# Patient Record
Sex: MALE | Race: WHITE | NOT HISPANIC OR LATINO | URBAN - METROPOLITAN AREA
[De-identification: names, ages, dates, MRNs, and addresses within clinical notes are randomized per-mention and may not be internally consistent; named-entity substitution may affect disease eponyms.]

---

## 2023-08-22 LAB
ALANINE AMINOTRANSFERASE (SGPT) (U/L) IN SER/PLAS: 19 U/L (ref 10–52)
ALBUMIN (G/DL) IN SER/PLAS: 4.6 G/DL (ref 3.4–5)
ALKALINE PHOSPHATASE (U/L) IN SER/PLAS: 55 U/L (ref 33–120)
ANION GAP IN SER/PLAS: 11 MMOL/L (ref 10–20)
ASPARTATE AMINOTRANSFERASE (SGOT) (U/L) IN SER/PLAS: 17 U/L (ref 9–39)
BILIRUBIN TOTAL (MG/DL) IN SER/PLAS: 0.6 MG/DL (ref 0–1.2)
CALCIUM (MG/DL) IN SER/PLAS: 9.5 MG/DL (ref 8.6–10.3)
CARBON DIOXIDE, TOTAL (MMOL/L) IN SER/PLAS: 28 MMOL/L (ref 21–32)
CHLORIDE (MMOL/L) IN SER/PLAS: 102 MMOL/L (ref 98–107)
CHOLESTEROL (MG/DL) IN SER/PLAS: 184 MG/DL (ref 0–199)
CHOLESTEROL IN HDL (MG/DL) IN SER/PLAS: 46.7 MG/DL
CHOLESTEROL/HDL RATIO: 3.9
CREATININE (MG/DL) IN SER/PLAS: 1.48 MG/DL (ref 0.5–1.3)
GFR MALE: 56 ML/MIN/1.73M2
GLUCOSE (MG/DL) IN SER/PLAS: 93 MG/DL (ref 74–99)
LDL: 116 MG/DL (ref 0–99)
POTASSIUM (MMOL/L) IN SER/PLAS: 4.4 MMOL/L (ref 3.5–5.3)
PROTEIN TOTAL: 7.5 G/DL (ref 6.4–8.2)
SODIUM (MMOL/L) IN SER/PLAS: 137 MMOL/L (ref 136–145)
THYROTROPIN (MIU/L) IN SER/PLAS BY DETECTION LIMIT <= 0.05 MIU/L: 2.14 MIU/L (ref 0.44–3.98)
TRIGLYCERIDE (MG/DL) IN SER/PLAS: 108 MG/DL (ref 0–149)
UREA NITROGEN (MG/DL) IN SER/PLAS: 17 MG/DL (ref 6–23)
VLDL: 22 MG/DL (ref 0–40)

## 2023-08-23 LAB — CALCIDIOL (25 OH VITAMIN D3) (NG/ML) IN SER/PLAS: 73 NG/ML

## 2023-09-19 ENCOUNTER — HOSPITAL ENCOUNTER (OUTPATIENT)
Dept: DATA CONVERSION | Facility: HOSPITAL | Age: 53
Discharge: HOME | End: 2023-09-19

## 2023-09-19 DIAGNOSIS — N28.9 DISORDER OF KIDNEY AND URETER, UNSPECIFIED: ICD-10-CM

## 2023-09-19 LAB
ANION GAP SERPL CALCULATED.3IONS-SCNC: 11 MMOL/L (ref 0–19)
BUN SERPL-MCNC: 20 MG/DL (ref 8–25)
BUN/CREAT SERPL: 13.3 RATIO (ref 8–21)
CALCIUM SERPL-MCNC: 9.4 MG/DL (ref 8.5–10.4)
CHLORIDE SERPL-SCNC: 103 MMOL/L (ref 97–107)
CO2 SERPL-SCNC: 24 MMOL/L (ref 24–31)
CREAT SERPL-MCNC: 1.5 MG/DL (ref 0.4–1.6)
GFR SERPL CREATININE-BSD FRML MDRD: 56 ML/MIN/1.73 M2
GLUCOSE SERPL-MCNC: 98 MG/DL (ref 65–99)
POTASSIUM SERPL-SCNC: 4.9 MMOL/L (ref 3.4–5.1)
SODIUM SERPL-SCNC: 138 MMOL/L (ref 133–145)

## 2023-09-20 PROBLEM — R20.0 NUMBNESS: Status: ACTIVE | Noted: 2023-09-20

## 2023-09-20 PROBLEM — J45.40 MODERATE PERSISTENT ASTHMA WITHOUT COMPLICATION (HHS-HCC): Status: ACTIVE | Noted: 2023-09-20

## 2023-09-20 PROBLEM — E55.9 VITAMIN D DEFICIENCY: Status: ACTIVE | Noted: 2023-09-20

## 2023-09-20 PROBLEM — M16.11 OSTEOARTHRITIS OF RIGHT HIP: Status: ACTIVE | Noted: 2023-09-20

## 2023-09-20 PROBLEM — M15.9 PRIMARY OSTEOARTHRITIS INVOLVING MULTIPLE JOINTS: Status: ACTIVE | Noted: 2023-09-20

## 2023-09-20 PROBLEM — I10 HTN (HYPERTENSION): Status: ACTIVE | Noted: 2023-09-20

## 2023-09-20 PROBLEM — R53.82 CHRONIC FATIGUE: Status: ACTIVE | Noted: 2023-09-20

## 2023-09-20 PROBLEM — T78.01XA ANAPHYLAXIS DUE TO PEANUTS: Status: ACTIVE | Noted: 2023-09-20

## 2023-09-20 PROBLEM — M17.11 PATELLOFEMORAL ARTHRITIS OF RIGHT KNEE: Status: ACTIVE | Noted: 2023-09-20

## 2023-09-20 PROBLEM — S73.109A HIP SPRAIN: Status: ACTIVE | Noted: 2023-09-20

## 2023-09-20 PROBLEM — J45.909 ASTHMA (HHS-HCC): Status: ACTIVE | Noted: 2023-09-20

## 2023-09-20 PROBLEM — M15.0 PRIMARY OSTEOARTHRITIS INVOLVING MULTIPLE JOINTS: Status: ACTIVE | Noted: 2023-09-20

## 2023-09-20 PROBLEM — M25.361 PATELLAR INSTABILITY OF RIGHT KNEE: Status: ACTIVE | Noted: 2023-09-20

## 2023-09-20 RX ORDER — LISINOPRIL 20 MG/1
20 TABLET ORAL DAILY
COMMUNITY
Start: 2013-08-12 | End: 2023-10-30

## 2023-09-20 RX ORDER — ALBUTEROL SULFATE 90 UG/1
2 POWDER, METERED RESPIRATORY (INHALATION) EVERY 8 HOURS
COMMUNITY
Start: 2016-04-19 | End: 2024-02-21 | Stop reason: SDUPTHER

## 2023-09-20 RX ORDER — LEVOCETIRIZINE DIHYDROCHLORIDE 5 MG/1
5 TABLET, FILM COATED ORAL EVERY EVENING
COMMUNITY
End: 2024-02-21 | Stop reason: SDUPTHER

## 2023-09-20 RX ORDER — MELOXICAM 15 MG/1
1 TABLET ORAL DAILY PRN
COMMUNITY
Start: 2021-04-27

## 2023-09-20 RX ORDER — FLUTICASONE FUROATE AND VILANTEROL 200; 25 UG/1; UG/1
1 POWDER RESPIRATORY (INHALATION) DAILY
COMMUNITY
End: 2024-02-21 | Stop reason: SDUPTHER

## 2023-09-20 RX ORDER — FLUNISOLIDE 0.25 MG/ML
2 SOLUTION NASAL DAILY
COMMUNITY
End: 2024-02-21 | Stop reason: SDUPTHER

## 2023-09-20 RX ORDER — CHOLECALCIFEROL (VITAMIN D3) 25 MCG
25 TABLET ORAL DAILY
COMMUNITY

## 2023-09-20 RX ORDER — EPINEPHRINE 0.3 MG/.3ML
1 INJECTION INTRAMUSCULAR ONCE AS NEEDED
COMMUNITY
Start: 2016-03-23

## 2023-09-20 RX ORDER — MOMETASONE FUROATE 1 MG/G
CREAM TOPICAL
COMMUNITY
Start: 2016-07-21

## 2023-10-29 DIAGNOSIS — I10 ESSENTIAL (PRIMARY) HYPERTENSION: ICD-10-CM

## 2023-10-30 RX ORDER — LISINOPRIL 20 MG/1
20 TABLET ORAL DAILY
Qty: 90 TABLET | Refills: 1 | Status: SHIPPED | OUTPATIENT
Start: 2023-10-30 | End: 2024-02-21 | Stop reason: SDUPTHER

## 2024-01-18 ENCOUNTER — TELEMEDICINE (OUTPATIENT)
Dept: PRIMARY CARE | Facility: CLINIC | Age: 54
End: 2024-01-18
Payer: COMMERCIAL

## 2024-01-18 DIAGNOSIS — J01.90 ACUTE NON-RECURRENT SINUSITIS, UNSPECIFIED LOCATION: Primary | ICD-10-CM

## 2024-01-18 PROCEDURE — 99442 PR PHYS/QHP TELEPHONE EVALUATION 11-20 MIN: CPT | Performed by: STUDENT IN AN ORGANIZED HEALTH CARE EDUCATION/TRAINING PROGRAM

## 2024-01-18 RX ORDER — TAMSULOSIN HYDROCHLORIDE 0.4 MG/1
0.4 CAPSULE ORAL EVERY EVENING
COMMUNITY

## 2024-01-18 RX ORDER — GUAIFENESIN 1200 MG/1
1200 TABLET, EXTENDED RELEASE ORAL EVERY 12 HOURS PRN
Qty: 28 TABLET | Refills: 0 | Status: SHIPPED | OUTPATIENT
Start: 2024-01-18 | End: 2024-02-01

## 2024-01-18 RX ORDER — AMOXICILLIN AND CLAVULANATE POTASSIUM 875; 125 MG/1; MG/1
875 TABLET, FILM COATED ORAL 2 TIMES DAILY
Qty: 20 TABLET | Refills: 0 | Status: SHIPPED | OUTPATIENT
Start: 2024-01-18 | End: 2024-01-28

## 2024-01-18 ASSESSMENT — PATIENT HEALTH QUESTIONNAIRE - PHQ9
2. FEELING DOWN, DEPRESSED OR HOPELESS: NOT AT ALL
SUM OF ALL RESPONSES TO PHQ9 QUESTIONS 1 AND 2: 0
1. LITTLE INTEREST OR PLEASURE IN DOING THINGS: NOT AT ALL

## 2024-01-18 ASSESSMENT — PAIN SCALES - GENERAL: PAINLEVEL: 3

## 2024-01-18 NOTE — PROGRESS NOTES
Outpatient Visit Note    No chief complaint on file.      With patient's permission, this is a Telemedicine visit with AUDIO ONLY. The provider and patient participated in this telemedicine encounter.    HPI:  Ashley Velazquez is a 53 y.o. male presenting to discuss recent illness symptoms.    Started middle of last week.  Runny nose, sinus congestion, cough.  Doesn't otherwise feel sick.  No sick contacts.  No GI sx.    Mucinex and Day Quil without significant relief.    Used inhaler a few times, working well when used.  No other SOB or chest pain..         Current Medications  Current Outpatient Medications   Medication Instructions    albuterol (ProAir RespiClick) 90 mcg/actuation aerosol powdr breath activated inhaler 2 puffs, inhalation, Every 8 hours, As needed 30 minutes before exercise.    cholecalciferol (VITAMIN D3) 25 mcg, oral, Daily    EPINEPHrine (EpiPen 2-Hosea) 0.3 mg/0.3 mL injection syringe 1 Syringe, intramuscular, Once as needed    flunisolide (Nasalide) 25 mcg (0.025 %) spray,non-aerosol 2 sprays, Each Nostril, Daily    fluticasone furoate-vilanteroL (Breo Ellipta) 200-25 mcg/dose inhaler 1 puff, inhalation, Daily    levocetirizine (XYZAL) 5 mg, oral, Every evening    lisinopril 20 mg, oral, Daily    meloxicam (Mobic) 15 mg tablet 1 tablet, oral, Daily PRN    mometasone (Elocon) 0.1 % cream Topical, APPLY SPARINGLY TO AFFECTED AREAS TWICE DAILY.(AM AND PM).    MULTIVITAMIN ORAL 1 tablet, oral, Daily    tamsulosin (FLOMAX) 0.4 mg, oral, Every evening        Allergies  Allergies   Allergen Reactions    Peanut Swelling    Grass Pollen Itching    Other Itching    Shellfish Derived Unknown     Tested via allergy testing        Past Medical History:   Diagnosis Date    Personal history of diseases of the skin and subcutaneous tissue 04/19/2016    History of urticaria    Personal history of other diseases of the musculoskeletal system and connective tissue 04/19/2016    History of arthritis    Personal  history of other diseases of the respiratory system 04/19/2016    History of asthma      Past Surgical History:   Procedure Laterality Date    SINUS SURGERY  04/19/2016    Sinus Surgery     Family History   Problem Relation Name Age of Onset    Hypertension Mother      Hypertension Father      No Known Problems Sister      No Known Problems Sister          Tobacco Use: Not on file        ROS  All pertinent positive symptoms are included in the history of present illness.  All other systems have been reviewed and are negative and noncontributory to this patient's current ailments.    VITAL SIGNS  There were no vitals filed for this visit.  There were no vitals filed for this visit.   There is no height or weight on file to calculate BMI.   Patient is unable to proivide    PHYSICAL EXAM  GENERAL:  Alert and oriented x 3, Pleasant and cooperative, No acute distress.   LUNGS:  No conversational dyspnea or cough during encounter.   PSYCH:  appropriate mood and affect, no difficulty with speech.   Telemedicine visit, no other exam component done.      Assessment/Plan   Problem List Items Addressed This Visit    None  Visit Diagnoses         Codes    Acute non-recurrent sinusitis, unspecified location    -  Primary J01.90    Relevant Medications    amoxicillin-pot clavulanate (Augmentin) 875-125 mg tablet    guaiFENesin (Mucinex) 1,200 mg tablet extended release 12hr          Lisa Grider MD   01/18/24   9:27 AM

## 2024-01-18 NOTE — PROGRESS NOTES
Pt states has runny nose, cough, congestion, sinus pressure has tried dayqiul and mucinex, pt state shas not taken any med s today

## 2024-02-21 ENCOUNTER — OFFICE VISIT (OUTPATIENT)
Dept: PRIMARY CARE | Facility: CLINIC | Age: 54
End: 2024-02-21
Payer: COMMERCIAL

## 2024-02-21 VITALS
HEART RATE: 83 BPM | BODY MASS INDEX: 30.46 KG/M2 | DIASTOLIC BLOOD PRESSURE: 80 MMHG | OXYGEN SATURATION: 97 % | WEIGHT: 201 LBS | HEIGHT: 68 IN | TEMPERATURE: 96.9 F | SYSTOLIC BLOOD PRESSURE: 120 MMHG

## 2024-02-21 DIAGNOSIS — I10 ESSENTIAL (PRIMARY) HYPERTENSION: ICD-10-CM

## 2024-02-21 DIAGNOSIS — Z13.6 SCREENING FOR CARDIOVASCULAR CONDITION: ICD-10-CM

## 2024-02-21 DIAGNOSIS — J30.9 ALLERGIC RHINITIS, UNSPECIFIED SEASONALITY, UNSPECIFIED TRIGGER: ICD-10-CM

## 2024-02-21 DIAGNOSIS — R94.31 ABNORMAL EKG: ICD-10-CM

## 2024-02-21 DIAGNOSIS — J45.40 MODERATE PERSISTENT ASTHMA WITHOUT COMPLICATION (HHS-HCC): ICD-10-CM

## 2024-02-21 DIAGNOSIS — R07.9 INTERMITTENT CHEST PAIN: Primary | ICD-10-CM

## 2024-02-21 PROCEDURE — 3079F DIAST BP 80-89 MM HG: CPT | Performed by: STUDENT IN AN ORGANIZED HEALTH CARE EDUCATION/TRAINING PROGRAM

## 2024-02-21 PROCEDURE — 1036F TOBACCO NON-USER: CPT | Performed by: STUDENT IN AN ORGANIZED HEALTH CARE EDUCATION/TRAINING PROGRAM

## 2024-02-21 PROCEDURE — 93000 ELECTROCARDIOGRAM COMPLETE: CPT | Performed by: STUDENT IN AN ORGANIZED HEALTH CARE EDUCATION/TRAINING PROGRAM

## 2024-02-21 PROCEDURE — 93005 ELECTROCARDIOGRAM TRACING: CPT | Performed by: STUDENT IN AN ORGANIZED HEALTH CARE EDUCATION/TRAINING PROGRAM

## 2024-02-21 PROCEDURE — 99214 OFFICE O/P EST MOD 30 MIN: CPT | Performed by: STUDENT IN AN ORGANIZED HEALTH CARE EDUCATION/TRAINING PROGRAM

## 2024-02-21 PROCEDURE — 3074F SYST BP LT 130 MM HG: CPT | Performed by: STUDENT IN AN ORGANIZED HEALTH CARE EDUCATION/TRAINING PROGRAM

## 2024-02-21 RX ORDER — LISINOPRIL 20 MG/1
20 TABLET ORAL DAILY
Qty: 90 TABLET | Refills: 1 | Status: SHIPPED | OUTPATIENT
Start: 2024-02-21 | End: 2024-04-01 | Stop reason: SDUPTHER

## 2024-02-21 RX ORDER — FLUTICASONE FUROATE AND VILANTEROL 200; 25 UG/1; UG/1
1 POWDER RESPIRATORY (INHALATION) DAILY
Qty: 3 EACH | Refills: 1 | Status: SHIPPED | OUTPATIENT
Start: 2024-02-21 | End: 2024-04-11 | Stop reason: SDUPTHER

## 2024-02-21 RX ORDER — FLUNISOLIDE 0.25 MG/ML
2 SOLUTION NASAL DAILY
Qty: 25 ML | Refills: 5 | Status: SHIPPED | OUTPATIENT
Start: 2024-02-21 | End: 2024-05-21

## 2024-02-21 RX ORDER — ALBUTEROL SULFATE 90 UG/1
2 POWDER, METERED RESPIRATORY (INHALATION) EVERY 8 HOURS
Qty: 3 EACH | Refills: 1 | Status: SHIPPED | OUTPATIENT
Start: 2024-02-21 | End: 2024-08-19

## 2024-02-21 RX ORDER — LEVOCETIRIZINE DIHYDROCHLORIDE 5 MG/1
5 TABLET, FILM COATED ORAL EVERY EVENING
Qty: 90 TABLET | Refills: 1 | Status: SHIPPED | OUTPATIENT
Start: 2024-02-21 | End: 2024-04-01 | Stop reason: SDUPTHER

## 2024-02-21 ASSESSMENT — PATIENT HEALTH QUESTIONNAIRE - PHQ9
1. LITTLE INTEREST OR PLEASURE IN DOING THINGS: NOT AT ALL
SUM OF ALL RESPONSES TO PHQ9 QUESTIONS 1 AND 2: 0
2. FEELING DOWN, DEPRESSED OR HOPELESS: NOT AT ALL

## 2024-02-21 ASSESSMENT — PAIN SCALES - GENERAL: PAINLEVEL: 0-NO PAIN

## 2024-02-21 NOTE — PATIENT INSTRUCTIONS
Thank you for coming to see me today.    Medication refill sent to pharmacy, we will fax Breo prescription as usual.    Go to the lab for fasting blood work, we will call you with all results.    CT cardiac scoring ordered today, call to schedule.    Cardiology referral also entered today, call to schedule.    Follow-up with me in 6 months for physical exam and labs, sooner if needed (pending lab and imaging results)

## 2024-02-21 NOTE — PROGRESS NOTES
"Subjective   Ashley Velazquez is a 53 y.o. male who presents for follow up.    HPI:    Presenting for medication refills.    Also concern for intermittent left-sided sharp chest pain.  No current symptoms today.  Seems to happen randomly, often at rest.  No apparent exacerbation with exertion or physical activity.  He is concerned due to family history of heart disease.    Denies any shortness of breath.  Using inhalers as prescribed.    Ongoing allergic sinusitis issues, using Xyzal, nasal spray.    Compliant with blood pressure medications as prescribed.  No recent missed dosages.  Intermittent chest pain as noted above.  No pressure/palpitations, no headache or vision changes.  No noted leg swelling or orthopnea.    ROS:   Review of systems is essentially negative for all systems except for any identified issues in HPI above.    Objective     /80   Pulse 83   Temp 36.1 °C (96.9 °F)   Ht 1.727 m (5' 8\")   Wt 91.2 kg (201 lb)   SpO2 97%   BMI 30.56 kg/m²      PHYSICAL EXAM    GENERAL  Well-appearing, pleasant and cooperative.  No acute distress.    HEENT  HEAD:   Normocephalic.  Atraumatic.  EYES:  PERRLA.  No scleral icterus or conjunctival injection.  NECK:  No adenopathy.  No palpable thyroid enlargement or nodules.    THROAT:  Moist oropharynx without tonsillar enlargement or exudates.    LUNGS:    Clear to auscultation bilaterally.  No wheezes, rales, rhonchi.    CARDIAC:  Regular rate and rhythm.  Normal S1S2.  No murmurs/rubs/gallops.    ABDOMEN:  Soft, non-tender, non-distended.  No hepatosplenomegaly.  Normoactive bowel sounds.    MUSCULOSKELETAL:  No gross abnormalities.   No joint swelling or erythema,.  No spinal or paraspinal tenderness to palpation.    EXTREMITIES:  No LE edema or cyanosis.      NEURO           Alert and oriented x3. No focal deficits.    PSYCH:          Affect appropriate.           Assessment/Plan   Problem List Items Addressed This Visit       Asthma    Relevant " Medications    albuterol (ProAir RespiClick) 90 mcg/actuation aerosol [facility] breath activated inhaler    fluticasone furoate-vilanteroL (Breo Ellipta) 200-25 mcg/dose inhaler    Allergic rhinitis    Relevant Medications    levocetirizine (Xyzal) 5 mg tablet    flunisolide (Nasalide) 25 mcg (0.025 %) spray,non-aerosol     Other Visit Diagnoses       Intermittent chest pain    -  Primary    No pain today.  EKG showing incomplete RBBB, no acute changes.  Cardiology referral entered today, patient agreeable.  ED precautions reviewed.    Relevant Orders    ECG 12 lead (Clinic Performed) (Completed)    Referral to Cardiology    Lipid panel    Comprehensive metabolic panel    Magnesium    CBC    Essential (primary) hypertension        Relevant Medications    lisinopril 20 mg tablet    Screening for cardiovascular condition        Relevant Orders    CT cardiac scoring wo IV contrast    Lipid panel    Abnormal EKG        Relevant Orders    Referral to Cardiology    Comprehensive metabolic panel    Magnesium    CBC            Time Spent  Prep time on day of patient encounter: 3 minutes  Time spent directly with patient, family or caregiver: 20 minutes  Additional Time Spent on Patient Care Activities: 5 minutes  Documentation Time: 5 minutes  Other Time Spent: 0 minutes  Total: 33 minutes        Lisa Grider MD

## 2024-02-23 ENCOUNTER — TELEPHONE (OUTPATIENT)
Dept: PRIMARY CARE | Facility: CLINIC | Age: 54
End: 2024-02-23
Payer: COMMERCIAL

## 2024-02-23 DIAGNOSIS — J45.40 MODERATE PERSISTENT ASTHMA WITHOUT COMPLICATION (HHS-HCC): Primary | ICD-10-CM

## 2024-02-23 NOTE — TELEPHONE ENCOUNTER
Please notify patient.  Would he be okay with a more traditional albuterol inhaler (not the aerosol powder)?

## 2024-02-26 RX ORDER — ALBUTEROL SULFATE 90 UG/1
2 AEROSOL, METERED RESPIRATORY (INHALATION) EVERY 4 HOURS PRN
Qty: 8 G | Refills: 5 | Status: SHIPPED | OUTPATIENT
Start: 2024-02-26 | End: 2025-02-25

## 2024-02-26 NOTE — TELEPHONE ENCOUNTER
Pt left vm stating that he spoke to his insurance company and he has information for  us regarding inhaler. Pt did not elaborate further.

## 2024-03-05 ENCOUNTER — LAB (OUTPATIENT)
Dept: LAB | Facility: LAB | Age: 54
End: 2024-03-05
Payer: COMMERCIAL

## 2024-03-05 DIAGNOSIS — R94.31 ABNORMAL EKG: ICD-10-CM

## 2024-03-05 DIAGNOSIS — R94.4 DECREASED GFR: Primary | ICD-10-CM

## 2024-03-05 DIAGNOSIS — Z13.6 SCREENING FOR CARDIOVASCULAR CONDITION: ICD-10-CM

## 2024-03-05 DIAGNOSIS — R07.9 INTERMITTENT CHEST PAIN: ICD-10-CM

## 2024-03-05 LAB
ALBUMIN SERPL-MCNC: 4.3 G/DL (ref 3.5–5)
ALP BLD-CCNC: 67 U/L (ref 35–125)
ALT SERPL-CCNC: 15 U/L (ref 5–40)
ANION GAP SERPL CALC-SCNC: 13 MMOL/L
AST SERPL-CCNC: 16 U/L (ref 5–40)
BILIRUB SERPL-MCNC: 0.4 MG/DL (ref 0.1–1.2)
BUN SERPL-MCNC: 21 MG/DL (ref 8–25)
CALCIUM SERPL-MCNC: 9 MG/DL (ref 8.5–10.4)
CHLORIDE SERPL-SCNC: 103 MMOL/L (ref 97–107)
CHOLEST SERPL-MCNC: 185 MG/DL (ref 133–200)
CHOLEST/HDLC SERPL: 3.7 {RATIO}
CO2 SERPL-SCNC: 23 MMOL/L (ref 24–31)
CREAT SERPL-MCNC: 1.6 MG/DL (ref 0.4–1.6)
EGFRCR SERPLBLD CKD-EPI 2021: 51 ML/MIN/1.73M*2
ERYTHROCYTE [DISTWIDTH] IN BLOOD BY AUTOMATED COUNT: 13.3 % (ref 11.5–14.5)
GLUCOSE SERPL-MCNC: 90 MG/DL (ref 65–99)
HCT VFR BLD AUTO: 43 % (ref 41–52)
HDLC SERPL-MCNC: 50 MG/DL
HGB BLD-MCNC: 14.4 G/DL (ref 13.5–17.5)
LDLC SERPL CALC-MCNC: 117 MG/DL (ref 65–130)
MAGNESIUM SERPL-MCNC: 2.2 MG/DL (ref 1.6–3.1)
MCH RBC QN AUTO: 31.6 PG (ref 26–34)
MCHC RBC AUTO-ENTMCNC: 33.5 G/DL (ref 32–36)
MCV RBC AUTO: 95 FL (ref 80–100)
NRBC BLD-RTO: 0 /100 WBCS (ref 0–0)
PLATELET # BLD AUTO: 173 X10*3/UL (ref 150–450)
POTASSIUM SERPL-SCNC: 4.4 MMOL/L (ref 3.4–5.1)
PROT SERPL-MCNC: 6.7 G/DL (ref 5.9–7.9)
RBC # BLD AUTO: 4.55 X10*6/UL (ref 4.5–5.9)
SODIUM SERPL-SCNC: 139 MMOL/L (ref 133–145)
TRIGL SERPL-MCNC: 89 MG/DL (ref 40–150)
WBC # BLD AUTO: 5.5 X10*3/UL (ref 4.4–11.3)

## 2024-03-05 PROCEDURE — 80053 COMPREHEN METABOLIC PANEL: CPT

## 2024-03-05 PROCEDURE — 36415 COLL VENOUS BLD VENIPUNCTURE: CPT

## 2024-03-05 PROCEDURE — 80061 LIPID PANEL: CPT

## 2024-03-05 PROCEDURE — 83735 ASSAY OF MAGNESIUM: CPT

## 2024-03-05 PROCEDURE — 85027 COMPLETE CBC AUTOMATED: CPT

## 2024-03-06 ENCOUNTER — TELEPHONE (OUTPATIENT)
Dept: PRIMARY CARE | Facility: CLINIC | Age: 54
End: 2024-03-06
Payer: COMMERCIAL

## 2024-04-01 ENCOUNTER — APPOINTMENT (OUTPATIENT)
Dept: CARDIOLOGY | Facility: CLINIC | Age: 54
End: 2024-04-01
Payer: COMMERCIAL

## 2024-04-01 DIAGNOSIS — J30.9 ALLERGIC RHINITIS, UNSPECIFIED SEASONALITY, UNSPECIFIED TRIGGER: ICD-10-CM

## 2024-04-01 DIAGNOSIS — I10 ESSENTIAL (PRIMARY) HYPERTENSION: ICD-10-CM

## 2024-04-01 PROBLEM — R07.89 OTHER CHEST PAIN: Status: ACTIVE | Noted: 2024-04-01

## 2024-04-01 RX ORDER — LEVOCETIRIZINE DIHYDROCHLORIDE 5 MG/1
5 TABLET, FILM COATED ORAL EVERY EVENING
Qty: 90 TABLET | Refills: 1 | Status: SHIPPED | OUTPATIENT
Start: 2024-04-01 | End: 2024-09-28

## 2024-04-01 RX ORDER — LISINOPRIL 20 MG/1
20 TABLET ORAL DAILY
Qty: 90 TABLET | Refills: 1 | Status: SHIPPED | OUTPATIENT
Start: 2024-04-01 | End: 2024-05-20 | Stop reason: SDUPTHER

## 2024-04-01 NOTE — TELEPHONE ENCOUNTER
Pt is requesting a refill on Rx Lisinopril 20 mg, Levocetirizine 5 mg, Please send to [facility]. Pt also states that he needs authorization for the [facility].

## 2024-04-01 NOTE — TELEPHONE ENCOUNTER
Call patient back. Stop MMF. Check temperature. Check for covid at home. If worse tomorrow, go to urgent care.    Last OV 2/21/2024.  Rx sent.

## 2024-04-10 ENCOUNTER — LAB (OUTPATIENT)
Dept: LAB | Facility: LAB | Age: 54
End: 2024-04-10
Payer: COMMERCIAL

## 2024-04-10 DIAGNOSIS — Z12.5 ENCOUNTER FOR SCREENING FOR MALIGNANT NEOPLASM OF PROSTATE: Primary | ICD-10-CM

## 2024-04-10 LAB — PSA SERPL-MCNC: 0.67 NG/ML

## 2024-04-10 PROCEDURE — 84153 ASSAY OF PSA TOTAL: CPT

## 2024-04-10 PROCEDURE — 36415 COLL VENOUS BLD VENIPUNCTURE: CPT

## 2024-04-11 DIAGNOSIS — J45.40 MODERATE PERSISTENT ASTHMA WITHOUT COMPLICATION (HHS-HCC): ICD-10-CM

## 2024-04-11 RX ORDER — FLUTICASONE FUROATE AND VILANTEROL 200; 25 UG/1; UG/1
1 POWDER RESPIRATORY (INHALATION) DAILY
Qty: 3 EACH | Refills: 3 | Status: SHIPPED | OUTPATIENT
Start: 2024-04-11

## 2024-04-11 NOTE — TELEPHONE ENCOUNTER
PT requesting refill on Breo inhaler.  PT states pharmacy only has 1 refill on file, need 3 refills instead of 1. Please send to [facility]. Fax [phone]

## 2024-04-14 ENCOUNTER — HOSPITAL ENCOUNTER (OUTPATIENT)
Dept: RADIOLOGY | Facility: HOSPITAL | Age: 54
End: 2024-04-14
Payer: COMMERCIAL

## 2024-05-20 DIAGNOSIS — I10 ESSENTIAL (PRIMARY) HYPERTENSION: ICD-10-CM

## 2024-05-20 RX ORDER — LISINOPRIL 20 MG/1
20 TABLET ORAL DAILY
Qty: 90 TABLET | Refills: 1 | Status: SHIPPED | OUTPATIENT
Start: 2024-05-20

## 2024-07-03 PROBLEM — M25.551 PAIN IN RIGHT HIP: Status: ACTIVE | Noted: 2017-03-01

## 2024-07-03 PROBLEM — L84 CORNS AND CALLOSITIES: Status: ACTIVE | Noted: 2024-07-03

## 2024-07-03 PROBLEM — N40.1 BENIGN PROSTATIC HYPERPLASIA WITH URINARY OBSTRUCTION AND OTHER LOWER URINARY TRACT SYMPTOMS: Status: ACTIVE | Noted: 2017-03-15

## 2024-07-03 PROBLEM — N52.9 IMPOTENCE OF ORGANIC ORIGIN: Status: ACTIVE | Noted: 2017-03-08

## 2024-07-03 PROBLEM — M62.81 MUSCLE WEAKNESS: Status: ACTIVE | Noted: 2017-03-01

## 2024-07-03 PROBLEM — Z71.85 ENCOUNTER FOR IMMUNIZATION SAFETY COUNSELING: Status: ACTIVE | Noted: 2024-07-03

## 2024-07-03 PROBLEM — M25.569 KNEE PAIN: Status: ACTIVE | Noted: 2024-07-03

## 2024-07-03 PROBLEM — G47.26 CIRCADIAN RHYTHM SLEEP DISORDER, SHIFT WORK TYPE: Status: ACTIVE | Noted: 2024-07-03

## 2024-07-03 PROBLEM — M79.602 PAIN OF LEFT UPPER EXTREMITY: Status: ACTIVE | Noted: 2024-07-03

## 2024-07-03 PROBLEM — Z96.649 HIP JOINT REPLACEMENT STATUS: Status: ACTIVE | Noted: 2017-01-26

## 2024-07-03 PROBLEM — G89.29 CHRONIC PAIN: Status: ACTIVE | Noted: 2024-07-03

## 2024-07-03 PROBLEM — G47.33 OBSTRUCTIVE SLEEP APNEA SYNDROME IN ADULT: Status: ACTIVE | Noted: 2024-07-03

## 2024-07-03 PROBLEM — N13.8 BENIGN PROSTATIC HYPERPLASIA WITH URINARY OBSTRUCTION AND OTHER LOWER URINARY TRACT SYMPTOMS: Status: ACTIVE | Noted: 2017-03-15

## 2024-07-03 RX ORDER — TADALAFIL 20 MG/1
20 TABLET ORAL DAILY PRN
COMMUNITY
Start: 2024-03-06

## 2024-07-05 ENCOUNTER — OFFICE VISIT (OUTPATIENT)
Dept: CARDIOLOGY | Facility: CLINIC | Age: 54
End: 2024-07-05
Payer: COMMERCIAL

## 2024-07-05 VITALS
HEIGHT: 68 IN | DIASTOLIC BLOOD PRESSURE: 66 MMHG | HEART RATE: 62 BPM | WEIGHT: 201.1 LBS | BODY MASS INDEX: 30.48 KG/M2 | SYSTOLIC BLOOD PRESSURE: 114 MMHG | OXYGEN SATURATION: 94 %

## 2024-07-05 DIAGNOSIS — R09.89 CAROTID BRUIT, UNSPECIFIED LATERALITY: ICD-10-CM

## 2024-07-05 DIAGNOSIS — R94.31 ABNORMAL EKG: ICD-10-CM

## 2024-07-05 DIAGNOSIS — I25.10 CORONARY ARTERY DISEASE INVOLVING NATIVE CORONARY ARTERY OF NATIVE HEART WITHOUT ANGINA PECTORIS: Primary | ICD-10-CM

## 2024-07-05 DIAGNOSIS — R07.9 INTERMITTENT CHEST PAIN: ICD-10-CM

## 2024-07-05 PROCEDURE — 3074F SYST BP LT 130 MM HG: CPT | Performed by: INTERNAL MEDICINE

## 2024-07-05 PROCEDURE — 99204 OFFICE O/P NEW MOD 45 MIN: CPT | Performed by: INTERNAL MEDICINE

## 2024-07-05 PROCEDURE — 3078F DIAST BP <80 MM HG: CPT | Performed by: INTERNAL MEDICINE

## 2024-07-05 PROCEDURE — 99214 OFFICE O/P EST MOD 30 MIN: CPT | Performed by: INTERNAL MEDICINE

## 2024-07-05 ASSESSMENT — PAIN SCALES - GENERAL: PAINLEVEL: 0-NO PAIN

## 2024-07-05 NOTE — PROGRESS NOTES
Primary Care Physician: Lisa Grider MD  Date of Visit: 07/05/2024  9:30 AM EDT  Location [place] [facility] [address]     Chief Complaint:   Chief Complaint   Patient presents with    Consult     New pt    new pt     HPI / Summary:   Ashley Velazquez is a 53 y.o. male presents for new patient    ROS    Medical History:   He has a past medical history of Personal history of diseases of the skin and subcutaneous tissue (04/19/2016), Personal history of other diseases of the musculoskeletal system and connective tissue (04/19/2016), and Personal history of other diseases of the respiratory system (04/19/2016).  Surgical Hx:   He has a past surgical history that includes Sinus surgery (04/19/2016).   Social Hx:   He reports that he has never smoked. He has never used smokeless tobacco. He reports that he does not currently use alcohol. He reports that he does not use drugs.  Family Hx:   His family history includes Hypertension in his father and mother; No Known Problems in his sister and sister.   Allergies:  Allergies   Allergen Reactions    Peanut Swelling    Grass Pollen Itching    Other Itching    Shellfish Derived Unknown     Tested via allergy testing     Outpatient Medications:  Current Outpatient Medications   Medication Instructions    albuterol (ProAir RespiClick) 90 mcg/actuation aerosol powdr breath activated inhaler 2 puffs, inhalation, Every 8 hours, As needed 30 minutes before exercise.    albuterol (Ventolin HFA) 90 mcg/actuation inhaler 2 puffs, inhalation, Every 4 hours PRN    cholecalciferol (VITAMIN D3) 25 mcg, oral, Daily    EPINEPHrine (EpiPen 2-Hosea) 0.3 mg/0.3 mL injection syringe 1 Syringe, intramuscular, Once as needed    flunisolide (Nasalide) 25 mcg (0.025 %) spray,non-aerosol 2 sprays, Each Nostril, Daily    fluticasone furoate-vilanteroL (Breo Ellipta) 200-25 mcg/dose inhaler 1 puff, inhalation, Daily    levocetirizine (XYZAL) 5 mg, oral, Every evening    lisinopril 20 mg, oral,  "Daily    meloxicam (Mobic) 15 mg tablet 1 tablet, oral, Daily PRN    mometasone (Elocon) 0.1 % cream Topical, APPLY SPARINGLY TO AFFECTED AREAS TWICE DAILY.(AM AND PM).    MULTIVITAMIN ORAL 1 tablet, oral, Daily    tadalafil (CIALIS) 20 mg, oral, Daily PRN    tamsulosin (FLOMAX) 0.4 mg, oral, Every evening     Physical Exam:  Vitals:    07/05/24 0926 07/05/24 0929 07/05/24 1038   BP: 123/83 121/75 114/66   BP Location: Left arm Right arm    Patient Position: Sitting     BP Cuff Size: Adult     Pulse: 71 69 62   SpO2: 94%     Weight: 91.2 kg (201 lb 1.6 oz)     Height: 1.727 m (5' 8\")       Wt Readings from Last 5 Encounters:   07/05/24 91.2 kg (201 lb 1.6 oz)   02/21/24 91.2 kg (201 lb)   09/27/23 88.9 kg (196 lb)   08/21/23 90.3 kg (199 lb)   07/26/23 88.9 kg (196 lb)     Physical Exam  JVP not elevated. Carotid impulses are 2+ without overlying bruit.   Chest exhibits fair to good air movement with completely clear breath sounds.   The cardiac rhythm is regular with no premature beats.   Normal S1 and S2. No gallop, murmur or rub, or click.   Abdomen is soft and benign without focal tenderness.   With no lower leg edema. The pedal pulses are intact.     Last Labs:  Lab on 04/10/2024   Component Date Value    Prostate Specific AG 04/10/2024 0.67    Lab on 03/05/2024   Component Date Value    Cholesterol 03/05/2024 185     HDL-Cholesterol 03/05/2024 50.0     Cholesterol/HDL Ratio 03/05/2024 3.7     LDL Calculated 03/05/2024 117     Triglycerides 03/05/2024 89     Glucose 03/05/2024 90     Sodium 03/05/2024 139     Potassium 03/05/2024 4.4     Chloride 03/05/2024 103     Bicarbonate 03/05/2024 23 (L)     Urea Nitrogen 03/05/2024 21     Creatinine 03/05/2024 1.60     eGFR 03/05/2024 51 (L)     Calcium 03/05/2024 9.0     Albumin 03/05/2024 4.3     Alkaline Phosphatase 03/05/2024 67     Total Protein 03/05/2024 6.7     AST 03/05/2024 16     Bilirubin, Total 03/05/2024 0.4     ALT 03/05/2024 15     Anion Gap 03/05/2024 " 13     Magnesium 03/05/2024 2.20     WBC 03/05/2024 5.5     nRBC 03/05/2024 0.0     RBC 03/05/2024 4.55     Hemoglobin 03/05/2024 14.4     Hematocrit 03/05/2024 43.0     MCV 03/05/2024 95     MCH 03/05/2024 31.6     MCHC 03/05/2024 33.5     RDW 03/05/2024 13.3     Platelets 03/05/2024 173         Assessment/Plan   1.  Hypertension.  The patient has been previously prescribed lisinopril 20 mg daily which appears to be providing adequate blood pressure control.  2.  Nondiabetic.  3.  Not hyperlipidemic.  The patient's lipid panel from 3/5/2024 included cholesterol 185  HDL 50 triglyceride 89.  4.  Former smoker.  Patient previously had smoked only social cigars.  5.?  Coronary artery disease.  Diagnosis not confirmed with a CT coronary calcium score of 0 performed on 9/9/2022.  The patient's most recent EKG from 2/21/2024 shows sinus rhythm RSR prime in V1 consistent with RV conduction delay nondiagnostic inferior Q waves.  Patient remains worried as to his cardiac status.  Will check echocardiogram as well as a screening carotid ultrasound.  6.  Bronchospastic airway disease.  Patient utilizes Breo daily with albuterol inhaler as needed.  7.  Status post bilateral total hip replacement.  8.  Status post open reduction internal fixation of right radial fracture  9.  Status post repair of deviated nasal septum.  10.  Status post hernia repair.        Orders:  Orders Placed This Encounter   Procedures    Transthoracic echo (TTE) complete      Followup Appts:  Future Appointments   Date Time Provider Department [place]   7/22/2024  9:00 AM MENT VASC ROOM [id number] [facility] [place]   7/22/2024 10:30 AM MENT ECHO/STRESS [id number] [facility] [place]   7/29/2024  9:00 AM Nathan Ho MD [id number] [facility]   8/21/2024  9:00 AM Lisa Grider MD [id number] [facility]   11/8/2024  9:15 AM Yon Godoy MD [id number] [facility]           ____________________________________________________________  Yon Godoy  MD [facility]  Assistant Clinical Professor, [employer]  [facility]

## 2024-07-22 ENCOUNTER — HOSPITAL ENCOUNTER (OUTPATIENT)
Dept: VASCULAR MEDICINE | Facility: CLINIC | Age: 54
Discharge: HOME | End: 2024-07-22
Payer: COMMERCIAL

## 2024-07-22 ENCOUNTER — HOSPITAL ENCOUNTER (OUTPATIENT)
Dept: CARDIOLOGY | Facility: CLINIC | Age: 54
Discharge: HOME | End: 2024-07-22
Payer: COMMERCIAL

## 2024-07-22 VITALS
HEIGHT: 68 IN | WEIGHT: 201 LBS | DIASTOLIC BLOOD PRESSURE: 82 MMHG | BODY MASS INDEX: 30.46 KG/M2 | SYSTOLIC BLOOD PRESSURE: 132 MMHG

## 2024-07-22 DIAGNOSIS — R09.89 CAROTID BRUIT, UNSPECIFIED LATERALITY: ICD-10-CM

## 2024-07-22 DIAGNOSIS — I25.10 CORONARY ARTERY DISEASE INVOLVING NATIVE CORONARY ARTERY OF NATIVE HEART WITHOUT ANGINA PECTORIS: ICD-10-CM

## 2024-07-22 LAB
AORTIC VALVE PEAK VELOCITY: 1.34 M/S
AV PEAK GRADIENT: 7.2 MMHG
AVA (PEAK VEL): 2.82 CM2
EJECTION FRACTION APICAL 4 CHAMBER: 53.4
EJECTION FRACTION: 63 %
LEFT ATRIUM VOLUME AREA LENGTH INDEX BSA: 31 ML/M2
LEFT VENTRICLE INTERNAL DIMENSION DIASTOLE: 4.37 CM (ref 3.5–6)
LEFT VENTRICULAR OUTFLOW TRACT DIAMETER: 2.02 CM
MITRAL VALVE E/A RATIO: 1.53
MITRAL VALVE E/E' RATIO: 9.3
RIGHT VENTRICLE FREE WALL PEAK S': 14.01 CM/S

## 2024-07-22 PROCEDURE — 93880 EXTRACRANIAL BILAT STUDY: CPT | Performed by: INTERNAL MEDICINE

## 2024-07-22 PROCEDURE — 93306 TTE W/DOPPLER COMPLETE: CPT

## 2024-07-22 PROCEDURE — 93880 EXTRACRANIAL BILAT STUDY: CPT

## 2024-07-22 PROCEDURE — 93306 TTE W/DOPPLER COMPLETE: CPT | Performed by: INTERNAL MEDICINE

## 2024-07-24 ENCOUNTER — HOSPITAL ENCOUNTER (OUTPATIENT)
Dept: RADIOLOGY | Facility: HOSPITAL | Age: 54
Discharge: HOME | End: 2024-07-24
Payer: COMMERCIAL

## 2024-07-24 ENCOUNTER — LAB (OUTPATIENT)
Dept: LAB | Facility: LAB | Age: 54
End: 2024-07-24
Payer: COMMERCIAL

## 2024-07-24 DIAGNOSIS — N18.31 CHRONIC KIDNEY DISEASE, STAGE 3A (MULTI): Primary | ICD-10-CM

## 2024-07-24 DIAGNOSIS — N18.30 CHRONIC KIDNEY DISEASE, STAGE 3 UNSPECIFIED (MULTI): ICD-10-CM

## 2024-07-24 LAB
25(OH)D3 SERPL-MCNC: 45 NG/ML (ref 31–100)
ALBUMIN SERPL-MCNC: 4.5 G/DL (ref 3.5–5)
ANION GAP SERPL CALC-SCNC: 11 MMOL/L
APPEARANCE UR: CLEAR
BILIRUB UR STRIP.AUTO-MCNC: NEGATIVE MG/DL
BUN SERPL-MCNC: 17 MG/DL (ref 8–25)
C3 SERPL-MCNC: 126 MG/DL (ref 87–200)
C4 SERPL-MCNC: 27 MG/DL (ref 10–50)
CALCIUM SERPL-MCNC: 9.2 MG/DL (ref 8.5–10.4)
CHLORIDE SERPL-SCNC: 105 MMOL/L (ref 97–107)
CO2 SERPL-SCNC: 24 MMOL/L (ref 24–31)
COLOR UR: NORMAL
CREAT SERPL-MCNC: 1.5 MG/DL (ref 0.4–1.6)
EGFRCR SERPLBLD CKD-EPI 2021: 55 ML/MIN/1.73M*2
ERYTHROCYTE [DISTWIDTH] IN BLOOD BY AUTOMATED COUNT: 12.9 % (ref 11.5–14.5)
GLUCOSE SERPL-MCNC: 94 MG/DL (ref 65–99)
GLUCOSE UR STRIP.AUTO-MCNC: NORMAL MG/DL
HAV IGM SER QL: NONREACTIVE
HBV CORE IGM SER QL: NONREACTIVE
HBV SURFACE AG SERPL QL IA: NONREACTIVE
HCT VFR BLD AUTO: 43.2 % (ref 41–52)
HCV AB SER QL: NONREACTIVE
HGB BLD-MCNC: 14.7 G/DL (ref 13.5–17.5)
KETONES UR STRIP.AUTO-MCNC: NEGATIVE MG/DL
LEUKOCYTE ESTERASE UR QL STRIP.AUTO: NEGATIVE
MCH RBC QN AUTO: 32.4 PG (ref 26–34)
MCHC RBC AUTO-ENTMCNC: 34 G/DL (ref 32–36)
MCV RBC AUTO: 95 FL (ref 80–100)
NITRITE UR QL STRIP.AUTO: NEGATIVE
NRBC BLD-RTO: 0 /100 WBCS (ref 0–0)
PH UR STRIP.AUTO: 5.5 [PH]
PHOSPHATE SERPL-MCNC: 3.9 MG/DL (ref 2.5–4.5)
PLATELET # BLD AUTO: 189 X10*3/UL (ref 150–450)
POTASSIUM SERPL-SCNC: 5.5 MMOL/L (ref 3.4–5.1)
PROT SERPL-MCNC: 6.9 G/DL (ref 6.4–8.2)
PROT UR STRIP.AUTO-MCNC: NEGATIVE MG/DL
PROT UR-ACNC: 5 MG/DL (ref 5–25)
PTH-INTACT SERPL-MCNC: 65.1 PG/ML (ref 18.5–88)
RBC # BLD AUTO: 4.54 X10*6/UL (ref 4.5–5.9)
RBC # UR STRIP.AUTO: NEGATIVE /UL
SODIUM SERPL-SCNC: 140 MMOL/L (ref 133–145)
SP GR UR STRIP.AUTO: 1.01
URATE SERPL-MCNC: 7 MG/DL (ref 3.6–7.7)
UROBILINOGEN UR STRIP.AUTO-MCNC: NORMAL MG/DL
WBC # BLD AUTO: 4.7 X10*3/UL (ref 4.4–11.3)

## 2024-07-24 PROCEDURE — 86160 COMPLEMENT ANTIGEN: CPT

## 2024-07-24 PROCEDURE — 85027 COMPLETE CBC AUTOMATED: CPT

## 2024-07-24 PROCEDURE — 86038 ANTINUCLEAR ANTIBODIES: CPT

## 2024-07-24 PROCEDURE — 81003 URINALYSIS AUTO W/O SCOPE: CPT

## 2024-07-24 PROCEDURE — 80074 ACUTE HEPATITIS PANEL: CPT

## 2024-07-24 PROCEDURE — 82306 VITAMIN D 25 HYDROXY: CPT

## 2024-07-24 PROCEDURE — 84155 ASSAY OF PROTEIN SERUM: CPT

## 2024-07-24 PROCEDURE — 86036 ANCA SCREEN EACH ANTIBODY: CPT

## 2024-07-24 PROCEDURE — 84165 PROTEIN E-PHORESIS SERUM: CPT

## 2024-07-24 PROCEDURE — 84550 ASSAY OF BLOOD/URIC ACID: CPT

## 2024-07-24 PROCEDURE — 80069 RENAL FUNCTION PANEL: CPT

## 2024-07-24 PROCEDURE — 76770 US EXAM ABDO BACK WALL COMP: CPT | Performed by: STUDENT IN AN ORGANIZED HEALTH CARE EDUCATION/TRAINING PROGRAM

## 2024-07-24 PROCEDURE — 36415 COLL VENOUS BLD VENIPUNCTURE: CPT

## 2024-07-24 PROCEDURE — 76770 US EXAM ABDO BACK WALL COMP: CPT

## 2024-07-24 PROCEDURE — 83970 ASSAY OF PARATHORMONE: CPT

## 2024-07-24 PROCEDURE — 84156 ASSAY OF PROTEIN URINE: CPT

## 2024-07-24 PROCEDURE — 84166 PROTEIN E-PHORESIS/URINE/CSF: CPT

## 2024-07-24 PROCEDURE — 86335 IMMUNFIX E-PHORSIS/URINE/CSF: CPT

## 2024-07-25 LAB
ALBUMIN: 4.2 G/DL (ref 3.4–5)
ALPHA 1 GLOBULIN: 0.2 G/DL (ref 0.2–0.6)
ALPHA 2 GLOBULIN: 0.6 G/DL (ref 0.4–1.1)
BETA GLOBULIN: 0.9 G/DL (ref 0.5–1.2)
GAMMA GLOBULIN: 0.9 G/DL (ref 0.5–1.4)
PATH REVIEW-SERUM PROTEIN ELECTROPHORESIS: NORMAL
PROTEIN ELECTROPHORESIS COMMENT: NORMAL

## 2024-07-26 LAB
ANA SER QL HEP2 SUBST: NEGATIVE
ANCA AB PATTERN SER IF-IMP: NORMAL
ANCA IGG TITR SER IF: NORMAL {TITER}

## 2024-07-27 LAB
ALBUMIN MFR UR ELPH: 26.7 %
ALPHA1 GLOB MFR UR ELPH: 10 %
ALPHA2 GLOB MFR UR ELPH: 19 %
B-GLOBULIN MFR UR ELPH: 31.2 %
GAMMA GLOB MFR UR ELPH: 13.1 %
IMMUNOFIXATION COMMENT: NORMAL
PATH REVIEW - URINE IMMUNOFIXATION: NORMAL
PATH REVIEW-URINE PROTEIN ELECTROPHORESIS: NORMAL
URINE ELECTROPHORESIS COMMENT: NORMAL

## 2024-07-29 ENCOUNTER — APPOINTMENT (OUTPATIENT)
Dept: SLEEP MEDICINE | Facility: CLINIC | Age: 54
End: 2024-07-29
Payer: COMMERCIAL

## 2024-07-29 VITALS
DIASTOLIC BLOOD PRESSURE: 74 MMHG | HEART RATE: 85 BPM | BODY MASS INDEX: 30.31 KG/M2 | SYSTOLIC BLOOD PRESSURE: 102 MMHG | OXYGEN SATURATION: 97 % | HEIGHT: 68 IN | WEIGHT: 200 LBS

## 2024-07-29 DIAGNOSIS — G47.26 CIRCADIAN RHYTHM SLEEP DISORDER, SHIFT WORK TYPE: ICD-10-CM

## 2024-07-29 DIAGNOSIS — G47.33 OBSTRUCTIVE SLEEP APNEA SYNDROME IN ADULT: Primary | ICD-10-CM

## 2024-07-29 PROCEDURE — 99214 OFFICE O/P EST MOD 30 MIN: CPT | Performed by: INTERNAL MEDICINE

## 2024-07-29 PROCEDURE — 3008F BODY MASS INDEX DOCD: CPT | Performed by: INTERNAL MEDICINE

## 2024-07-29 PROCEDURE — 3078F DIAST BP <80 MM HG: CPT | Performed by: INTERNAL MEDICINE

## 2024-07-29 PROCEDURE — 3074F SYST BP LT 130 MM HG: CPT | Performed by: INTERNAL MEDICINE

## 2024-07-29 PROCEDURE — 1036F TOBACCO NON-USER: CPT | Performed by: INTERNAL MEDICINE

## 2024-07-29 PROCEDURE — G2211 COMPLEX E/M VISIT ADD ON: HCPCS | Performed by: INTERNAL MEDICINE

## 2024-07-29 ASSESSMENT — SLEEP AND FATIGUE QUESTIONNAIRES
HOW LIKELY ARE YOU TO NOD OFF OR FALL ASLEEP IN A CAR, WHILE STOPPED FOR A FEW MINUTES IN TRAFFIC: SLIGHT CHANCE OF DOZING
HOW LIKELY ARE YOU TO NOD OFF OR FALL ASLEEP WHILE WATCHING TV: WOULD NEVER DOZE
HOW LIKELY ARE YOU TO NOD OFF OR FALL ASLEEP WHILE SITTING QUIETLY AFTER LUNCH WITHOUT ALCOHOL: SLIGHT CHANCE OF DOZING
HOW LIKELY ARE YOU TO NOD OFF OR FALL ASLEEP WHILE SITTING AND READING: MODERATE CHANCE OF DOZING
ESS-CHAD TOTAL SCORE: 10
HOW LIKELY ARE YOU TO NOD OFF OR FALL ASLEEP WHEN YOU ARE A PASSENGER IN A CAR FOR AN HOUR WITHOUT A BREAK: SLIGHT CHANCE OF DOZING
SITING INACTIVE IN A PUBLIC PLACE LIKE A CLASS ROOM OR A MOVIE THEATER: SLIGHT CHANCE OF DOZING
HOW LIKELY ARE YOU TO NOD OFF OR FALL ASLEEP WHILE SITTING AND TALKING TO SOMEONE: SLIGHT CHANCE OF DOZING
HOW LIKELY ARE YOU TO NOD OFF OR FALL ASLEEP WHILE LYING DOWN TO REST IN THE AFTERNOON WHEN CIRCUMSTANCES PERMIT: HIGH CHANCE OF DOZING

## 2024-07-29 ASSESSMENT — PAIN SCALES - GENERAL: PAINLEVEL: 0-NO PAIN

## 2024-07-29 NOTE — PATIENT INSTRUCTIONS
Call  the  Sleep Center ([phone]) to speak with a sleep testing center  to book your overnight sleep study procedure at one of our adult and pediatric-friendly sleep labs. Overnight sleep studies may be scheduled on a weekday or weekend.      We have child-life services on a case-by-case basis at the [facility] location. We also perform daytime testing for shift workers on a case by case basis.     For the study: Bring usual medications and nightly routine items for your sleep study.  You may wish to bring a snack and nightly routine items you feel would be important to help you sleep (e.g. favorite pillow). Bring your sleep logs/requested paperwork to your sleep study appointment.     Results of your sleep study will be given to the ordering clinician. Please contact their office for results or followup as directed by your clinician. For additional information about the sleep medicine services, please call [phone].     Locations for sleep studies are [facility]), [facility], [place], [place], [place], [place], [place], and [place].

## 2024-07-29 NOTE — PROGRESS NOTES
"  Subjective   Patient ID: Ashley Velazquez is a 53 y.o. male who presents for Sleep Apnea, Pap Adherence Followup, and Pap Intolerance (Returned machine ).  HPI    Prior Sleep History:  8/23/2023: AHI 3% 15, AHI 4% 7.4 SpO2 tammie 83%  Prescribed AutoPap 5 to 15 cm H2O patient struggled with the mask and had to return the device due to lack of adherence    Current Sleep History:  Here to discuss treatment options due to inability to tolerate AutoPap 5 to 15 cm H2O  He was difficulty with the nasal mask. Wants to try a different mask. He felt like it was digging into his nose. He had an under the nose nasal mask    ESS: 10     Review of Systems  Review of systems negative except as per HPI  Objective   /74   Pulse 85   Ht 1.727 m (5' 8\")   Wt 90.7 kg (200 lb)   SpO2 97%   BMI 30.41 kg/m²    PREVIOUS WEIGHTS:  Wt Readings from Last 3 Encounters:   07/29/24 90.7 kg (200 lb)   07/22/24 91.2 kg (201 lb)   07/05/24 91.2 kg (201 lb 1.6 oz)       Physical Exam  PHYSICAL EXAM: GENERAL: alert pleasant and cooperative no acute distress  nasal mucosa  and normal  MODIFIED PERSAUD SCORE: IV  MODIFIED MALLAMPATI SCORE: IV (only hard palate visible)  UVULA: midline  TONSILLAR SCORE: 1+  TONGUE SCALLOPING: midline  PSYCH EXAM: alert,oriented, in NAD with a full range of affect, normal behavior and no psychotic features    No results found for: \"IRON\", \"TIBC\", \"FERRITIN\"     Assessment/Plan   Problem List Items Addressed This Visit             ICD-10-CM    Circadian rhythm sleep disorder, 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    Ashley needs further evaluation through an in lab titration study. He could not tolerate auto-PAP and will benefit from mask refitting and PAP titration.   We will follow-up with management options once " testing is completed  Ashley verbalized understanding  Recommended follow-up 3-4 weeks after testing to discuss results and treatment options          Relevant Orders    In-Center Sleep Study (Sleep Provider Only)

## 2024-07-29 NOTE — ASSESSMENT & PLAN NOTE
Symptoms and physical exam are suggestive of sleep disordered breathing.    Ashley needs further evaluation through an in lab titration study. He could not tolerate auto-PAP and will benefit from mask refitting and PAP titration.   We will follow-up with management options once testing is completed  Ashley verbalized understanding  Recommended follow-up 3-4 weeks after testing to discuss results and treatment options

## 2024-08-21 ENCOUNTER — APPOINTMENT (OUTPATIENT)
Dept: PRIMARY CARE | Facility: CLINIC | Age: 54
End: 2024-08-21
Payer: COMMERCIAL

## 2024-08-29 ENCOUNTER — OFFICE VISIT (OUTPATIENT)
Dept: PRIMARY CARE | Facility: CLINIC | Age: 54
End: 2024-08-29
Payer: COMMERCIAL

## 2024-08-29 ENCOUNTER — LAB (OUTPATIENT)
Dept: LAB | Facility: LAB | Age: 54
End: 2024-08-29
Payer: COMMERCIAL

## 2024-08-29 VITALS
SYSTOLIC BLOOD PRESSURE: 110 MMHG | HEART RATE: 96 BPM | TEMPERATURE: 96.6 F | OXYGEN SATURATION: 97 % | BODY MASS INDEX: 30.16 KG/M2 | HEIGHT: 68 IN | DIASTOLIC BLOOD PRESSURE: 70 MMHG | WEIGHT: 199 LBS

## 2024-08-29 DIAGNOSIS — Z00.00 ANNUAL PHYSICAL EXAM: Primary | ICD-10-CM

## 2024-08-29 DIAGNOSIS — Z71.85 VACCINE COUNSELING: ICD-10-CM

## 2024-08-29 DIAGNOSIS — J30.9 ALLERGIC RHINITIS, UNSPECIFIED SEASONALITY, UNSPECIFIED TRIGGER: ICD-10-CM

## 2024-08-29 DIAGNOSIS — Z00.00 ANNUAL PHYSICAL EXAM: ICD-10-CM

## 2024-08-29 DIAGNOSIS — I10 PRIMARY HYPERTENSION: ICD-10-CM

## 2024-08-29 DIAGNOSIS — I10 ESSENTIAL (PRIMARY) HYPERTENSION: ICD-10-CM

## 2024-08-29 DIAGNOSIS — J45.40 MODERATE PERSISTENT ASTHMA WITHOUT COMPLICATION (HHS-HCC): ICD-10-CM

## 2024-08-29 DIAGNOSIS — Z13.6 SCREENING FOR CARDIOVASCULAR CONDITION: ICD-10-CM

## 2024-08-29 LAB
ALBUMIN SERPL-MCNC: 4.5 G/DL (ref 3.5–5)
ALP BLD-CCNC: 74 U/L (ref 35–125)
ALT SERPL-CCNC: 25 U/L (ref 5–40)
ANION GAP SERPL CALC-SCNC: 13 MMOL/L
AST SERPL-CCNC: 21 U/L (ref 5–40)
BILIRUB SERPL-MCNC: 0.4 MG/DL (ref 0.1–1.2)
BUN SERPL-MCNC: 17 MG/DL (ref 8–25)
CALCIUM SERPL-MCNC: 9.2 MG/DL (ref 8.5–10.4)
CHLORIDE SERPL-SCNC: 103 MMOL/L (ref 97–107)
CHOLEST SERPL-MCNC: 198 MG/DL (ref 133–200)
CHOLEST/HDLC SERPL: 4 {RATIO}
CO2 SERPL-SCNC: 24 MMOL/L (ref 24–31)
CREAT SERPL-MCNC: 1.4 MG/DL (ref 0.4–1.6)
EGFRCR SERPLBLD CKD-EPI 2021: 60 ML/MIN/1.73M*2
ERYTHROCYTE [DISTWIDTH] IN BLOOD BY AUTOMATED COUNT: 13 % (ref 11.5–14.5)
GLUCOSE SERPL-MCNC: 95 MG/DL (ref 65–99)
HCT VFR BLD AUTO: 43.1 % (ref 41–52)
HDLC SERPL-MCNC: 49 MG/DL
HGB BLD-MCNC: 15 G/DL (ref 13.5–17.5)
LDLC SERPL CALC-MCNC: 121 MG/DL (ref 65–130)
MCH RBC QN AUTO: 32.4 PG (ref 26–34)
MCHC RBC AUTO-ENTMCNC: 34.8 G/DL (ref 32–36)
MCV RBC AUTO: 93 FL (ref 80–100)
NRBC BLD-RTO: 0 /100 WBCS (ref 0–0)
PLATELET # BLD AUTO: 198 X10*3/UL (ref 150–450)
POTASSIUM SERPL-SCNC: 5.1 MMOL/L (ref 3.4–5.1)
PROT SERPL-MCNC: 7.2 G/DL (ref 5.9–7.9)
RBC # BLD AUTO: 4.63 X10*6/UL (ref 4.5–5.9)
SODIUM SERPL-SCNC: 140 MMOL/L (ref 133–145)
TRIGL SERPL-MCNC: 141 MG/DL (ref 40–150)
TSH SERPL DL<=0.05 MIU/L-ACNC: 2.18 MIU/L (ref 0.27–4.2)
WBC # BLD AUTO: 5.1 X10*3/UL (ref 4.4–11.3)

## 2024-08-29 PROCEDURE — 99215 OFFICE O/P EST HI 40 MIN: CPT | Performed by: STUDENT IN AN ORGANIZED HEALTH CARE EDUCATION/TRAINING PROGRAM

## 2024-08-29 PROCEDURE — 3078F DIAST BP <80 MM HG: CPT | Performed by: STUDENT IN AN ORGANIZED HEALTH CARE EDUCATION/TRAINING PROGRAM

## 2024-08-29 PROCEDURE — 99214 OFFICE O/P EST MOD 30 MIN: CPT | Performed by: STUDENT IN AN ORGANIZED HEALTH CARE EDUCATION/TRAINING PROGRAM

## 2024-08-29 PROCEDURE — 36415 COLL VENOUS BLD VENIPUNCTURE: CPT

## 2024-08-29 PROCEDURE — 84443 ASSAY THYROID STIM HORMONE: CPT

## 2024-08-29 PROCEDURE — 80061 LIPID PANEL: CPT

## 2024-08-29 PROCEDURE — 85027 COMPLETE CBC AUTOMATED: CPT

## 2024-08-29 PROCEDURE — 3008F BODY MASS INDEX DOCD: CPT | Performed by: STUDENT IN AN ORGANIZED HEALTH CARE EDUCATION/TRAINING PROGRAM

## 2024-08-29 PROCEDURE — 3074F SYST BP LT 130 MM HG: CPT | Performed by: STUDENT IN AN ORGANIZED HEALTH CARE EDUCATION/TRAINING PROGRAM

## 2024-08-29 PROCEDURE — 80053 COMPREHEN METABOLIC PANEL: CPT

## 2024-08-29 RX ORDER — FLUTICASONE FUROATE AND VILANTEROL 200; 25 UG/1; UG/1
1 POWDER RESPIRATORY (INHALATION) DAILY
Qty: 3 EACH | Refills: 3 | Status: SHIPPED | OUTPATIENT
Start: 2024-08-29

## 2024-08-29 RX ORDER — LISINOPRIL 20 MG/1
20 TABLET ORAL DAILY
Qty: 90 TABLET | Refills: 1 | Status: SHIPPED | OUTPATIENT
Start: 2024-08-29

## 2024-08-29 RX ORDER — LEVOCETIRIZINE DIHYDROCHLORIDE 5 MG/1
5 TABLET, FILM COATED ORAL EVERY EVENING
Qty: 90 TABLET | Refills: 1 | Status: SHIPPED | OUTPATIENT
Start: 2024-08-29 | End: 2025-02-25

## 2024-08-29 ASSESSMENT — PAIN SCALES - GENERAL: PAINLEVEL: 0-NO PAIN

## 2024-08-29 ASSESSMENT — PATIENT HEALTH QUESTIONNAIRE - PHQ9
SUM OF ALL RESPONSES TO PHQ9 QUESTIONS 1 AND 2: 0
1. LITTLE INTEREST OR PLEASURE IN DOING THINGS: NOT AT ALL
2. FEELING DOWN, DEPRESSED OR HOPELESS: NOT AT ALL

## 2024-08-29 NOTE — PROGRESS NOTES
Subjective   Ashley Velazquez is a 53 y.o. male who presents for follow up.    HPI:      53 year old male presenting for medication review.  Also due for routine preventative care visit/physical.    PREVENTATIVE HEALTH CARE:    Immunizations:  COVID:  DUE  Flu:  DUE  TDaP:  utd  Pneumonia:  DUE (hx of asthma) - declines  Shingles:  DUE - declines    Immunization History   Administered Date(s) Administered    Tdap vaccine, age 7 year and older (BOOSTRIX, ADACEL) 07/07/2018       Screenings:  HIV /HCV:  negative x2 8/27/2021  PSA:  wnl 4/10/2024 - utd  Colonoscopy:  5/9/2022 ([facility]) small sessile polyps x2, follow up 5 years - utd    Hypertension:  Managed with lisinopril 20 mg daily.  Patient reports compliance with all blood pressure medications as prescribed.  Denies symptoms of chest pain/pressure/palpitations.  Also without headache or vision changes.    Sleep study scheduled on September for updated sleep study.  Had to send back machine for non-compliance.    Asthma:  Managed with Breo Ellipta 1 puff daily, as needed albuterol.  Currently well-controlled.    Recent labs completed with nephrology (Dr. Longoria), reviewed    Lab Results   Component Value Date    WBC 5.1 08/29/2024    HGB 15.0 08/29/2024    HCT 43.1 08/29/2024    MCV 93 08/29/2024     08/29/2024       Chemistry    Lab Results   Component Value Date/Time     08/29/2024 1107    K 5.1 08/29/2024 1107     08/29/2024 1107    CO2 24 08/29/2024 1107    BUN 17 08/29/2024 1107    CREATININE 1.40 08/29/2024 1107    Lab Results   Component Value Date/Time    CALCIUM 9.2 08/29/2024 1107    ALKPHOS 74 08/29/2024 1107    AST 21 08/29/2024 1107    ALT 25 08/29/2024 1107    BILITOT 0.4 08/29/2024 1107        CTS:  Thinks he may need surgery eventually.      ROS:    Review of systems is essentially negative for all systems except for any identified issues in HPI above.    Objective     /70   Pulse 96   Temp 35.9 °C (96.6 °F)   Ht 1.727  "m (5' 8\")   Wt 90.3 kg (199 lb)   SpO2 97%   BMI 30.26 kg/m²      PHYSICAL EXAM    GENERAL  Well-appearing, pleasant and cooperative.  No acute distress.    HEENT  HEAD:   Normocephalic.  Atraumatic.  EYES:  PERRLA.  No scleral icterus or conjunctival injection.  EARS:  Tympanic membranes visualized bilaterally without erythema, fluid, or bulging.  NECK:  No adenopathy.  No palpable thyroid enlargement or nodules.    THROAT:  Moist oropharynx without tonsillar enlargement or exudates.    LUNGS:    Clear to auscultation bilaterally.  No wheezes, rales, rhonchi.    CARDIAC:  Regular rate and rhythm.  Normal S1S2.  No murmurs/rubs/gallops.    ABDOMEN:  Soft, non-tender, non-distended.  No hepatosplenomegaly.  Normoactive bowel sounds.    MUSCULOSKELETAL:  No gross abnormalities.   No joint swelling or erythema,.  No spinal or paraspinal tenderness to palpation.    EXTREMITIES:  No LE edema or cyanosis.      NEURO           Alert and oriented x3. No focal deficits.    PSYCH:          Affect appropriate.           Assessment/Plan   Problem List Items Addressed This Visit       Moderate persistent asthma without complication (HHS-HCC)    Relevant Medications    fluticasone furoate-vilanteroL (Breo Ellipta) 200-25 mcg/dose inhaler    HTN (hypertension)     Well controlled, continue lisinopril 20 mg daily.         Allergic rhinitis    Relevant Medications    levocetirizine (Xyzal) 5 mg tablet     Other Visit Diagnoses       Annual physical exam    -  Primary    Relevant Orders    Lipid Panel (Completed)    TSH with reflex to Free T4 if abnormal (Completed)    Comprehensive Metabolic Panel (Completed)    CBC (Completed)    Screening for cardiovascular condition        Relevant Orders    Lipid Panel (Completed)    Essential (primary) hypertension        Relevant Medications    lisinopril 20 mg tablet    Vaccine counseling              Counseling:   Medication education:    Education: The patient is counseled regarding " potential side effects of all new medications.    Understanding:  Patient expressed understanding.    Adherence:  No barriers to adherence identified.         Lisa Grider MD

## 2024-08-29 NOTE — PATIENT INSTRUCTIONS
Thank you for coming to see me today.    Go to the lab for fasting blood work, we will call you with all results.    Medication refill sent to your pharmacy.    Routine follow-up/medication review with me in 6 months, sooner if needed.

## 2024-09-20 ENCOUNTER — CLINICAL SUPPORT (OUTPATIENT)
Dept: SLEEP MEDICINE | Facility: CLINIC | Age: 54
End: 2024-09-20
Payer: COMMERCIAL

## 2024-09-20 DIAGNOSIS — G47.26 CIRCADIAN RHYTHM SLEEP DISORDER, SHIFT WORK TYPE: ICD-10-CM

## 2024-09-20 DIAGNOSIS — G47.33 OBSTRUCTIVE SLEEP APNEA SYNDROME IN ADULT: ICD-10-CM

## 2024-09-21 VITALS
BODY MASS INDEX: 30.07 KG/M2 | HEIGHT: 68 IN | RESPIRATION RATE: 14 BRPM | HEART RATE: 62 BPM | WEIGHT: 198.41 LBS | OXYGEN SATURATION: 98 % | DIASTOLIC BLOOD PRESSURE: 70 MMHG | SYSTOLIC BLOOD PRESSURE: 110 MMHG

## 2024-09-21 ASSESSMENT — SLEEP AND FATIGUE QUESTIONNAIRES
SITING INACTIVE IN A PUBLIC PLACE LIKE A CLASS ROOM OR A MOVIE THEATER: HIGH CHANCE OF DOZING
HOW LIKELY ARE YOU TO NOD OFF OR FALL ASLEEP WHILE SITTING QUIETLY AFTER LUNCH WITHOUT ALCOHOL: SLIGHT CHANCE OF DOZING
HOW LIKELY ARE YOU TO NOD OFF OR FALL ASLEEP IN A CAR, WHILE STOPPED FOR A FEW MINUTES IN TRAFFIC: WOULD NEVER DOZE
ESS-CHAD TOTAL SCORE: 14
HOW LIKELY ARE YOU TO NOD OFF OR FALL ASLEEP WHILE LYING DOWN TO REST IN THE AFTERNOON WHEN CIRCUMSTANCES PERMIT: MODERATE CHANCE OF DOZING
HOW LIKELY ARE YOU TO NOD OFF OR FALL ASLEEP WHEN YOU ARE A PASSENGER IN A CAR FOR AN HOUR WITHOUT A BREAK: MODERATE CHANCE OF DOZING
HOW LIKELY ARE YOU TO NOD OFF OR FALL ASLEEP WHILE SITTING AND TALKING TO SOMEONE: SLIGHT CHANCE OF DOZING
HOW LIKELY ARE YOU TO NOD OFF OR FALL ASLEEP WHILE SITTING AND READING: HIGH CHANCE OF DOZING
HOW LIKELY ARE YOU TO NOD OFF OR FALL ASLEEP WHILE WATCHING TV: MODERATE CHANCE OF DOZING

## 2024-09-21 NOTE — PROGRESS NOTES
[facility] TECH NOTE:     Patient: Ashley Velazquez   MRN//AGE: [id number]  1970  53 y.o.   Technologist: Tarun Lezama   Room: 3   Service Date: 2024        Sleep Testing Location: [facility]  [address] 41 cm  [place]: 14    TECHNOLOGIST SLEEP STUDY PROCEDURE NOTE:   This sleep study is being conducted according to the policies and procedures outlined by the AAS accreditation standards.  The sleep study procedure and processes involved during this appointment was explained to the patient/patient’s family, questions were answered. The patient/family verbalized understanding.      The patient is a 53 y.o. year old male scheduled for a CPAP titration     The study that was ultimately completed was a CPAP titration     The full study Was completed.  Patient questionnaires completed?: yes   Consents signed? yes      Initial Fall Risk Screening:     Ashley has not fallen in the last 6 months. Ashley does not have a fear of falling. He does not need assistance with sitting, standing, or walking. he does not need assistance walking in his home. he does not need assistance in an unfamiliar setting. The patient is notusing an assistive device.     Brief Study observations: This is a 53 year old male here for a CPAP Titration Study..  CPAP was initiated.at 2226 EP#10. CPAP was applied at 4cm H2O. CPAP pressure was increased for respiratory events. An EPR of 2 was added to CPAP due to frequent arousals.    Deviation to order/protocol and reason: None      If PAP, which was preferred mask/pressure/mode: Covington Paykel Eson II size medium       Other:None    After the procedure, the patient/family was informed to ensure followup with ordering clinician for testing results.      Technologist: Tarun Lezama

## 2024-09-25 ENCOUNTER — TELEPHONE (OUTPATIENT)
Dept: SLEEP MEDICINE | Facility: CLINIC | Age: 54
End: 2024-09-25
Payer: COMMERCIAL

## 2024-09-25 NOTE — TELEPHONE ENCOUNTER
----- Message from Nathan Ho sent at 9/25/2024  7:59 AM EDT -----  His sleep study is complete. He needs an appointment to discuss results. Virtual is fine

## 2024-10-08 ENCOUNTER — APPOINTMENT (OUTPATIENT)
Dept: SLEEP MEDICINE | Facility: CLINIC | Age: 54
End: 2024-10-08
Payer: COMMERCIAL

## 2024-10-08 DIAGNOSIS — G47.33 OBSTRUCTIVE SLEEP APNEA SYNDROME IN ADULT: Primary | ICD-10-CM

## 2024-10-08 PROCEDURE — 99213 OFFICE O/P EST LOW 20 MIN: CPT | Performed by: INTERNAL MEDICINE

## 2024-10-08 PROCEDURE — 1036F TOBACCO NON-USER: CPT | Performed by: INTERNAL MEDICINE

## 2024-10-08 ASSESSMENT — SLEEP AND FATIGUE QUESTIONNAIRES
ESS-CHAD TOTAL SCORE: 10
SITING INACTIVE IN A PUBLIC PLACE LIKE A CLASS ROOM OR A MOVIE THEATER: SLIGHT CHANCE OF DOZING
HOW LIKELY ARE YOU TO NOD OFF OR FALL ASLEEP IN A CAR, WHILE STOPPED FOR A FEW MINUTES IN TRAFFIC: SLIGHT CHANCE OF DOZING
HOW LIKELY ARE YOU TO NOD OFF OR FALL ASLEEP WHEN YOU ARE A PASSENGER IN A CAR FOR AN HOUR WITHOUT A BREAK: SLIGHT CHANCE OF DOZING
HOW LIKELY ARE YOU TO NOD OFF OR FALL ASLEEP WHILE SITTING QUIETLY AFTER LUNCH WITHOUT ALCOHOL: SLIGHT CHANCE OF DOZING
HOW LIKELY ARE YOU TO NOD OFF OR FALL ASLEEP WHILE SITTING AND READING: MODERATE CHANCE OF DOZING
HOW LIKELY ARE YOU TO NOD OFF OR FALL ASLEEP WHILE WATCHING TV: SLIGHT CHANCE OF DOZING
HOW LIKELY ARE YOU TO NOD OFF OR FALL ASLEEP WHILE SITTING AND TALKING TO SOMEONE: SLIGHT CHANCE OF DOZING
HOW LIKELY ARE YOU TO NOD OFF OR FALL ASLEEP WHILE LYING DOWN TO REST IN THE AFTERNOON WHEN CIRCUMSTANCES PERMIT: MODERATE CHANCE OF DOZING

## 2024-10-08 NOTE — PROGRESS NOTES
Patient: Ashley Velazquez    [id number]  : 1970 -- AGE 54 y.o.    Provider: Nathan Ho MD     Location [facility]   Service Date: 10/8/2024              [facility]  Followup Visit Note      Virtual or Telephone Consent  An interactive audio and video telecommunication system which permits real time communications between the patient (at the originating site) and provider (at the distant site) was utilized to provide this telehealth service.   Verbal consent was requested and obtained from Ashley Velazquez on this date, 10/08/24 for a telehealth visit.       HISTORY OF PRESENT ILLNESS     The patient's referring provider is: No ref. provider found    HISTORY OF PRESENT ILLNESS   Ashley Velazquez is a 54 y.o. male who presents to a [facility] for followup.     The patient  has a past medical history of Asthma (), Hypertension ( ?), Personal history of diseases of the skin and subcutaneous tissue (2016), Personal history of other diseases of the musculoskeletal system and connective tissue (2016), Personal history of other diseases of the respiratory system (2016), and Snoring..    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, they are asking for his sleep studies    ESS: 10       REVIEW OF SYSTEMS     REVIEW OF SYSTEMS  Review of Systems      ALLERGIES AND MEDICATIONS     ALLERGIES  Allergies   Allergen Reactions    Peanut Swelling    Grass Pollen Itching    Other Itching    Shellfish Derived Unknown     Tested via allergy testing       MEDICATIONS: He has a current medication list which includes the following prescription(s): albuterol - Inhale 2 puffs every 4  hours if needed for wheezing or shortness of breath, cholecalciferol - Take 1 tablet (25 mcg) by mouth once daily, epipen 2-bryon - Inject 0.3 mL (0.3 mg) into the muscle 1 time if needed, fluticasone furoate-vilanterol - Inhale 1 puff once daily, levocetirizine - Take 1 tablet (5 mg) by mouth once daily in the evening, lisinopril - Take 1 tablet (20 mg) by mouth once daily, meloxicam - Take 1 tablet (15 mg) by mouth once daily as needed, mometasone - Apply topically. APPLY SPARINGLY TO AFFECTED AREAS TWICE DAILY.(AM AND PM), multivitamin - Take 1 tablet by mouth once daily, tadalafil - Take 1 tablet (20 mg) by mouth once daily as needed, tamsulosin - Take 1 capsule (0.4 mg) by mouth once daily in the evening, proair respiclick - Inhale 2 puffs every 8 hours. As needed 30 minutes before exercise, and flunisolide - Administer 2 sprays into each nostril once daily.    PAST MEDICAL HISTORY : He  has a past medical history of Asthma (1974), Hypertension (2013 ?), Personal history of diseases of the skin and subcutaneous tissue (04/19/2016), Personal history of other diseases of the musculoskeletal system and connective tissue (04/19/2016), Personal history of other diseases of the respiratory system (04/19/2016), and Snoring.    PAST SURGICAL HISTORY: He  has a past surgical history that includes Sinus surgery (04/19/2016).     FAMILY HISTORY: No changes since previous visit. Otherwise non-contributory as charted.       SOCIAL HISTORY  He  reports that he has never smoked. He has never used smokeless tobacco. He reports that he does not currently use alcohol. He reports that he does not use drugs.       PHYSICAL EXAM     VITAL SIGNS: There were no vitals taken for this visit.     CURRENT WEIGHT: [unfilled]  BMI: [unfilled]  PREVIOUS WEIGHTS:  Wt Readings from Last 3 Encounters:   09/21/24 90 kg (198 lb 6.6 oz)   08/29/24 90.3 kg (199 lb)   07/29/24 90.7 kg (200 lb)       Physical Exam  PHYSICAL EXAM: GENERAL: alert pleasant  "and cooperative no acute distress  PSYCH EXAM: alert,oriented, in NAD with a full range of affect, normal behavior and no psychotic features      RESULTS/DATA     No results found for: \"IRON\", \"TRANSFERRIN\", \"IRONSAT\", \"TIBC\", \"FERRITIN\"    ASSESSMENT/PLAN     Mr. Velazquez is a 54 y.o. male and  has a past medical history of Asthma (1974), Hypertension (2013 ?), Personal history of diseases of the skin and subcutaneous tissue (04/19/2016), Personal history of other diseases of the musculoskeletal system and connective tissue (04/19/2016), Personal history of other diseases of the respiratory system (04/19/2016), and Snoring. He returns in followup to the [facility] for their Review Sleep Study Results       Problem List and Orders  Problem List Items Addressed This Visit             ICD-10-CM    Obstructive sleep apnea syndrome in adult - Primary G47.33     - The patient has sleep apnea and requires treatment.  - Start  Auto-PAP 4-6 cm H20 through [facility].  - Sleep apnea and PAP therapy education was provided at length in clinic today. Ashley  verbalized understanding.  - Diet, exercise, and weight loss were emphasized today in clinic, as were non-supine sleep, avoiding alcohol in the late evening, and driving or operating heavy machinery when sleepy.   -Ashley verbalized understanding.   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

## 2024-10-08 NOTE — ASSESSMENT & PLAN NOTE
- The patient has sleep apnea and requires treatment.  - Start  Auto-PAP 4-6 cm H20 through [employer].  - Sleep apnea and PAP therapy education was provided at length in clinic today. Ashley  verbalized understanding.  - Diet, exercise, and weight loss were emphasized today in clinic, as were non-supine sleep, avoiding alcohol in the late evening, and driving or operating heavy machinery when sleepy.   -Ashley verbalized understanding.   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

## 2024-10-18 DIAGNOSIS — N52.01 ERECTILE DYSFUNCTION DUE TO ARTERIAL INSUFFICIENCY: Primary | ICD-10-CM

## 2024-10-18 RX ORDER — TADALAFIL 20 MG/1
20 TABLET ORAL DAILY PRN
Qty: 20 TABLET | Refills: 0 | Status: SHIPPED | OUTPATIENT
Start: 2024-10-18

## 2024-11-08 ENCOUNTER — APPOINTMENT (OUTPATIENT)
Dept: CARDIOLOGY | Facility: CLINIC | Age: 54
End: 2024-11-08
Payer: COMMERCIAL

## 2024-11-08 ENCOUNTER — OFFICE VISIT (OUTPATIENT)
Dept: CARDIOLOGY | Facility: CLINIC | Age: 54
End: 2024-11-08
Payer: COMMERCIAL

## 2024-11-08 VITALS
BODY MASS INDEX: 30.01 KG/M2 | OXYGEN SATURATION: 94 % | DIASTOLIC BLOOD PRESSURE: 79 MMHG | SYSTOLIC BLOOD PRESSURE: 120 MMHG | WEIGHT: 198 LBS | HEIGHT: 68 IN | HEART RATE: 66 BPM

## 2024-11-08 DIAGNOSIS — I10 PRIMARY HYPERTENSION: Primary | ICD-10-CM

## 2024-11-08 DIAGNOSIS — G47.33 OBSTRUCTIVE SLEEP APNEA SYNDROME IN ADULT: ICD-10-CM

## 2024-11-08 PROCEDURE — 3008F BODY MASS INDEX DOCD: CPT | Performed by: NURSE PRACTITIONER

## 2024-11-08 PROCEDURE — 1036F TOBACCO NON-USER: CPT | Performed by: NURSE PRACTITIONER

## 2024-11-08 PROCEDURE — 3074F SYST BP LT 130 MM HG: CPT | Performed by: NURSE PRACTITIONER

## 2024-11-08 PROCEDURE — 99214 OFFICE O/P EST MOD 30 MIN: CPT | Performed by: NURSE PRACTITIONER

## 2024-11-08 PROCEDURE — 3078F DIAST BP <80 MM HG: CPT | Performed by: NURSE PRACTITIONER

## 2024-11-08 RX ORDER — GLUCOSAMINE/CHONDRO SU A 500-400 MG
1 TABLET ORAL DAILY
COMMUNITY

## 2024-11-08 ASSESSMENT — ENCOUNTER SYMPTOMS
OCCASIONAL FEELINGS OF UNSTEADINESS: 0
DEPRESSION: 0
CONSTITUTIONAL NEGATIVE: 1
LOSS OF SENSATION IN FEET: 0
CARDIOVASCULAR NEGATIVE: 1
NEUROLOGICAL NEGATIVE: 1
GASTROINTESTINAL NEGATIVE: 1
MUSCULOSKELETAL NEGATIVE: 1
RESPIRATORY NEGATIVE: 1

## 2024-11-08 NOTE — PROGRESS NOTES
"Chief Complaint:   Coronary Artery Disease, Follow-up, and Hypertension (Pt denies c/o chest pain or SOB, pt states he feels good. He started using CPAP in October and states he feels more resting and less tired. )    History Of Present Illness:    .Mr Velazquez returns in follow up.  Denies chest pain, sob, palpitations or pedal edema.  He is feeling better with cpap use, but is adjusting to different equipment.         Last Recorded Vitals:  Blood pressure 120/79, pulse 66, height 1.727 m (5' 8\"), weight 89.8 kg (198 lb), SpO2 94%.     Past Medical History:  Past Medical History:   Diagnosis Date    Asthma 1974    Hypertension 2013 ?    Personal history of diseases of the skin and subcutaneous tissue 04/19/2016    History of urticaria    Personal history of other diseases of the musculoskeletal system and connective tissue 04/19/2016    History of arthritis    Personal history of other diseases of the respiratory system 04/19/2016    History of asthma    Snoring         Past Surgical History:  Past Surgical History:   Procedure Laterality Date    SINUS SURGERY  04/19/2016    Sinus Surgery       Social History:  Social History     Socioeconomic History    Marital status: Single   Tobacco Use    Smoking status: Never    Smokeless tobacco: Never   Substance and Sexual Activity    Alcohol use: Not Currently     Comment: rare social    Drug use: Never    Sexual activity: Not Currently     Partners: Female       Family History:  Family History   Problem Relation Name Age of Onset    Hypertension Mother Gabrielle Velazquez     Heart disease Mother Gabrielle Velazquez     Hypertension Father      Cancer Sister Dionisio Caceres     No Known Problems Sister           Allergies:  Peanut, Grass pollen, and Shellfish derived    Outpatient Medications:  Current Outpatient Medications   Medication Sig Dispense Refill    albuterol (Ventolin HFA) 90 mcg/actuation inhaler Inhale 2 puffs every 4 hours if needed for wheezing or " shortness of breath. 8 g 5    EPINEPHrine (EpiPen 2-Hosea) 0.3 mg/0.3 mL injection syringe Inject 0.3 mL (0.3 mg) into the muscle 1 time if needed.      fluticasone furoate-vilanteroL (Breo Ellipta) 200-25 mcg/dose inhaler Inhale 1 puff once daily. 3 each 3    glucosamine-chondroitin 500-400 mg tablet Take 1 tablet by mouth once daily.      levocetirizine (Xyzal) 5 mg tablet Take 1 tablet (5 mg) by mouth once daily in the evening. 90 tablet 1    lisinopril 20 mg tablet Take 1 tablet (20 mg) by mouth once daily. 90 tablet 1    MULTIVITAMIN ORAL Take 1 tablet by mouth once daily.      tadalafil 20 mg tablet TAKE ONE TABLET BY MOUTH EVERY DAY AS NEEDED 20 tablet 0    tamsulosin (Flomax) 0.4 mg 24 hr capsule Take 1 capsule (0.4 mg) by mouth once daily in the evening.      albuterol (ProAir RespiClick) 90 mcg/actuation aerosol powdr breath activated inhaler Inhale 2 puffs every 8 hours. As needed 30 minutes before exercise. 3 each 1    cholecalciferol (Vitamin D3) 25 MCG (1000 UT) tablet Take 1 tablet (25 mcg) by mouth once daily.      flunisolide (Nasalide) 25 mcg (0.025 %) spray,non-aerosol Administer 2 sprays into each nostril once daily. 25 mL 5    mometasone (Elocon) 0.1 % cream Apply topically. APPLY SPARINGLY TO AFFECTED AREAS TWICE DAILY.(AM AND PM). (Patient not taking: Reported on 11/8/2024)       No current facility-administered medications for this visit.        Physical Exam:  Cardiovascular:      PMI at left midclavicular line. Normal rate. Regular rhythm. Normal S1. Normal S2.       Murmurs: There is no murmur.      No gallop.  No click. No rub.   Pulses:     Intact distal pulses.   Edema:     Peripheral edema absent.         ROS:  Review of Systems   Constitutional: Negative.   Cardiovascular: Negative.    Respiratory: Negative.     Skin: Negative.    Musculoskeletal: Negative.    Gastrointestinal: Negative.    Genitourinary: Negative.    Neurological: Negative.           Last Labs:  CBC -  Lab Results    Component Value Date    WBC 5.1 08/29/2024    HGB 15.0 08/29/2024    HCT 43.1 08/29/2024    MCV 93 08/29/2024     08/29/2024       CMP -  Lab Results   Component Value Date    CALCIUM 9.2 08/29/2024    PHOS 3.9 07/24/2024    PROT 7.2 08/29/2024    ALBUMIN 4.5 08/29/2024    AST 21 08/29/2024    ALT 25 08/29/2024    ALKPHOS 74 08/29/2024    BILITOT 0.4 08/29/2024       LIPID PANEL -   Lab Results   Component Value Date    CHOL 198 08/29/2024    TRIG 141 08/29/2024    HDL 49.0 08/29/2024    CHHDL 4.0 08/29/2024    LDLF 116 (H) 08/22/2023    VLDL 22 08/22/2023       RENAL FUNCTION PANEL -   Lab Results   Component Value Date    GLUCOSE 95 08/29/2024     08/29/2024    K 5.1 08/29/2024     08/29/2024    CO2 24 08/29/2024    ANIONGAP 13 08/29/2024    BUN 17 08/29/2024    CREATININE 1.40 08/29/2024    GFRMALE 56 (A) 08/22/2023    CALCIUM 9.2 08/29/2024    PHOS 3.9 07/24/2024    ALBUMIN 4.5 08/29/2024        Lab Results   Component Value Date    HGBA1C 5.7 08/27/2021         Assessment/Plan   Problem List Items Addressed This Visit    None      1.  Hypertension.  The patient has been previously prescribed lisinopril 20 mg daily which appears to be providing adequate blood pressure control.  2.  Nondiabetic.  3.  Not hyperlipidemic.  The patient's lipid panel from 3/5/2024 included cholesterol 185  HDL 50 triglyceride 89.  4.  Former smoker.  Patient previously had smoked only social cigars.  5.?  Coronary artery disease.  Diagnosis not confirmed with a CT coronary calcium score of 0 performed on 9/9/2022.  The patient's most recent EKG from 2/21/2024 shows sinus rhythm RSR prime in V1 consistent with RV conduction delay nondiagnostic inferior Q waves.  Patient remains worried as to his cardiac status.    Echo 07/2024  CONCLUSIONS:   1. The left ventricular systolic function is normal, with a visually estimated ejection fraction of 60-65%.   2. There is normal right ventricular global systolic  function.   Carotid US showed < 50% stenosis bilaterally.  6.  Bronchospastic airway disease.  Patient utilizes Breo daily with albuterol inhaler as needed.  7.  Status post bilateral total hip replacement.  8.  Status post open reduction internal fixation of right radial fracture  9.  Status post repair of deviated nasal septum.  10.  Status post hernia repair.       Alaina Leach, APRN-CNP

## 2024-11-13 DIAGNOSIS — I10 ESSENTIAL (PRIMARY) HYPERTENSION: ICD-10-CM

## 2024-11-13 RX ORDER — LISINOPRIL 20 MG/1
20 TABLET ORAL DAILY
Qty: 90 TABLET | Refills: 0 | Status: SHIPPED | OUTPATIENT
Start: 2024-11-13

## 2024-11-20 ENCOUNTER — APPOINTMENT (OUTPATIENT)
Dept: CARDIOLOGY | Facility: CLINIC | Age: 54
End: 2024-11-20
Payer: COMMERCIAL

## 2024-12-19 ENCOUNTER — TELEPHONE (OUTPATIENT)
Dept: PRIMARY CARE | Facility: CLINIC | Age: 54
End: 2024-12-19
Payer: COMMERCIAL

## 2024-12-20 NOTE — TELEPHONE ENCOUNTER
Please advise him to go to urgent care for evaluation and examination to rule out a sinus infection.

## 2025-01-21 ENCOUNTER — LAB (OUTPATIENT)
Dept: LAB | Facility: LAB | Age: 55
End: 2025-01-21
Payer: COMMERCIAL

## 2025-01-21 ENCOUNTER — APPOINTMENT (OUTPATIENT)
Dept: UROLOGY | Facility: CLINIC | Age: 55
End: 2025-01-21
Payer: COMMERCIAL

## 2025-01-21 DIAGNOSIS — R39.11 BENIGN PROSTATIC HYPERPLASIA WITH URINARY HESITANCY: Primary | ICD-10-CM

## 2025-01-21 DIAGNOSIS — N52.01 ERECTILE DYSFUNCTION DUE TO ARTERIAL INSUFFICIENCY: ICD-10-CM

## 2025-01-21 DIAGNOSIS — N40.1 BENIGN PROSTATIC HYPERPLASIA WITH URINARY HESITANCY: Primary | ICD-10-CM

## 2025-01-21 DIAGNOSIS — Z12.5 ENCOUNTER FOR SCREENING FOR MALIGNANT NEOPLASM OF PROSTATE: ICD-10-CM

## 2025-01-21 PROCEDURE — 99203 OFFICE O/P NEW LOW 30 MIN: CPT | Performed by: SURGERY

## 2025-01-21 PROCEDURE — 84403 ASSAY OF TOTAL TESTOSTERONE: CPT

## 2025-01-21 PROCEDURE — 1036F TOBACCO NON-USER: CPT | Performed by: SURGERY

## 2025-01-21 RX ORDER — TADALAFIL 20 MG/1
20 TABLET ORAL DAILY PRN
Qty: 20 TABLET | Refills: 3 | Status: SHIPPED | OUTPATIENT
Start: 2025-01-21

## 2025-01-21 RX ORDER — TAMSULOSIN HYDROCHLORIDE 0.4 MG/1
0.4 CAPSULE ORAL EVERY EVENING
Qty: 90 CAPSULE | Refills: 3 | Status: SHIPPED | OUTPATIENT
Start: 2025-01-21

## 2025-01-21 ASSESSMENT — PAIN SCALES - GENERAL: PAINLEVEL_OUTOF10: 0-NO PAIN

## 2025-01-21 NOTE — PROGRESS NOTES
Subjective   Patient ID: Ashley Velazquez is a 54 y.o. male who presents for Establish Care.    HPI  He is well-known to me from my previous practice.  He has a BPH history, for several years.  He is currently on Flomax.  He reports he is voiding well.  His AUA symptom score is 3.  He also has a history of erectile dysfunction.  He currently is on Cialis.  He does report improvement.  He reports a low libido.  He denies any caroline hematuria.      Review of Systems  As per HPI, remaining 10 systems negative        Objective   Physical Exam  Well nourished  Breathing comfortably  Abdomen soft, ND, NT                Assessment/Plan   Problem List Items Addressed This Visit    None  Visit Diagnoses         Codes    Benign prostatic hyperplasia with urinary hesitancy    -  Primary N40.1, R39.11    Relevant Medications    tamsulosin (Flomax) 0.4 mg 24 hr capsule    Other Relevant Orders    Follow Up In Urology    Erectile dysfunction due to arterial insufficiency     N52.01    Relevant Medications    tadalafil 20 mg tablet    Other Relevant Orders    Testosterone    Encounter for screening for malignant neoplasm of prostate     Z12.5               BPH.  Clinically he is doing quite well.  I will refill his Flomax today.  He will return and see me in 6 months.    ED. I will refill his Cialis today.  I have also ordered a testosterone level.    Prostate cancer screening.  His PSA last year was 0.6.  He will be due for repeat PSA later this year.          Silvia Christianson MD 01/21/25 1:27 PM

## 2025-01-22 LAB — TESTOST SERPL-MCNC: 346 NG/DL (ref 240–1000)

## 2025-01-23 ENCOUNTER — TELEPHONE (OUTPATIENT)
Dept: PRIMARY CARE | Facility: CLINIC | Age: 55
End: 2025-01-23
Payer: COMMERCIAL

## 2025-01-23 NOTE — TELEPHONE ENCOUNTER
Pt lvm stating he had a cough for the last week with some congestion and wheezing. Pt stating he went to  pt told he does not has rsv, flu, or covid. Pt asking if something could be called in for him to help with the congestion

## 2025-02-03 ENCOUNTER — APPOINTMENT (OUTPATIENT)
Dept: CARDIOLOGY | Facility: CLINIC | Age: 55
End: 2025-02-03
Payer: COMMERCIAL

## 2025-02-17 ENCOUNTER — OFFICE VISIT (OUTPATIENT)
Dept: PRIMARY CARE | Facility: CLINIC | Age: 55
End: 2025-02-17
Payer: COMMERCIAL

## 2025-02-17 VITALS
HEART RATE: 83 BPM | WEIGHT: 213 LBS | TEMPERATURE: 97 F | HEIGHT: 68 IN | BODY MASS INDEX: 32.28 KG/M2 | OXYGEN SATURATION: 98 % | DIASTOLIC BLOOD PRESSURE: 78 MMHG | SYSTOLIC BLOOD PRESSURE: 120 MMHG

## 2025-02-17 DIAGNOSIS — J45.40 MODERATE PERSISTENT ASTHMA WITHOUT COMPLICATION (HHS-HCC): ICD-10-CM

## 2025-02-17 DIAGNOSIS — J30.9 ALLERGIC RHINITIS, UNSPECIFIED SEASONALITY, UNSPECIFIED TRIGGER: ICD-10-CM

## 2025-02-17 DIAGNOSIS — T78.01XD PEANUT-INDUCED ANAPHYLAXIS, SUBSEQUENT ENCOUNTER: ICD-10-CM

## 2025-02-17 DIAGNOSIS — I10 ESSENTIAL (PRIMARY) HYPERTENSION: Primary | ICD-10-CM

## 2025-02-17 DIAGNOSIS — L20.9 ATOPIC DERMATITIS, UNSPECIFIED TYPE: ICD-10-CM

## 2025-02-17 PROCEDURE — 3008F BODY MASS INDEX DOCD: CPT | Performed by: STUDENT IN AN ORGANIZED HEALTH CARE EDUCATION/TRAINING PROGRAM

## 2025-02-17 PROCEDURE — 99214 OFFICE O/P EST MOD 30 MIN: CPT | Performed by: STUDENT IN AN ORGANIZED HEALTH CARE EDUCATION/TRAINING PROGRAM

## 2025-02-17 PROCEDURE — 3078F DIAST BP <80 MM HG: CPT | Performed by: STUDENT IN AN ORGANIZED HEALTH CARE EDUCATION/TRAINING PROGRAM

## 2025-02-17 PROCEDURE — 3074F SYST BP LT 130 MM HG: CPT | Performed by: STUDENT IN AN ORGANIZED HEALTH CARE EDUCATION/TRAINING PROGRAM

## 2025-02-17 RX ORDER — LISINOPRIL 20 MG/1
20 TABLET ORAL DAILY
Qty: 90 TABLET | Refills: 0 | Status: SHIPPED | OUTPATIENT
Start: 2025-02-17

## 2025-02-17 RX ORDER — EPINEPHRINE 0.3 MG/.3ML
INJECTION SUBCUTANEOUS
Qty: 2 EACH | Refills: 1 | Status: SHIPPED | OUTPATIENT
Start: 2025-02-17

## 2025-02-17 RX ORDER — LEVOCETIRIZINE DIHYDROCHLORIDE 5 MG/1
5 TABLET, FILM COATED ORAL EVERY EVENING
Qty: 90 TABLET | Refills: 1 | Status: SHIPPED | OUTPATIENT
Start: 2025-02-17 | End: 2025-08-16

## 2025-02-17 RX ORDER — TRIAMCINOLONE ACETONIDE 1 MG/G
OINTMENT TOPICAL 2 TIMES DAILY PRN
Qty: 80 G | Refills: 0 | Status: SHIPPED | OUTPATIENT
Start: 2025-02-17 | End: 2025-06-17

## 2025-02-17 RX ORDER — ALBUTEROL SULFATE 90 UG/1
2 INHALANT RESPIRATORY (INHALATION) EVERY 4 HOURS PRN
Qty: 8.5 G | Refills: 11 | Status: SHIPPED | OUTPATIENT
Start: 2025-02-17 | End: 2026-02-17

## 2025-02-17 RX ORDER — FLUTICASONE FUROATE AND VILANTEROL 200; 25 UG/1; UG/1
1 POWDER RESPIRATORY (INHALATION) DAILY
Qty: 3 EACH | Refills: 3 | Status: SHIPPED | OUTPATIENT
Start: 2025-02-17

## 2025-02-17 ASSESSMENT — PATIENT HEALTH QUESTIONNAIRE - PHQ9
2. FEELING DOWN, DEPRESSED OR HOPELESS: NOT AT ALL
1. LITTLE INTEREST OR PLEASURE IN DOING THINGS: NOT AT ALL
SUM OF ALL RESPONSES TO PHQ9 QUESTIONS 1 AND 2: 0

## 2025-02-17 ASSESSMENT — PAIN SCALES - GENERAL: PAINLEVEL_OUTOF10: 0-NO PAIN

## 2025-02-17 NOTE — PATIENT INSTRUCTIONS
Thank you for coming to see me today.    Medication refill sent to pharmacy including new prescription for triamcinolone for eczema.    Follow up with me in September for YEARLY PHYSICAL, as needed for acute concerns.

## 2025-02-17 NOTE — PROGRESS NOTES
"Subjective   Ashley Velazquez is a 54 y.o. male who presents for follow up.    HPI:      This is a 54-year-old male presenting for follow-up visit.  Last seen by me for yearly physical on 8/29/2024.    Hypertension:  Managed with lisinopril 20 mg daily.  Patient reports compliance with all blood pressure medications as prescribed.  Denies symptoms of chest pain/pressure/palpitations.  Also without headache or vision changes.     OSIRIS:  Sleep study scheduled on September for updated sleep study.  Had to send back machine for non-compliance.     Asthma:  Managed with Breo Ellipta 1 puff daily, as needed albuterol.  Currently well-controlled.    Sick in December, went to  and       Immunizations:  COVID:  DUE  Flu:  DUE  TDaP:  utd  Pneumonia:  DUE (hx of asthma) - declines  Shingles:  DUE - declines    Immunization History   Administered Date(s) Administered    Tdap vaccine, age 7 year and older (BOOSTRIX, ADACEL) 07/07/2018       Screenings:  HIV/HCV:  negative x2 8/27/2021 - utd  PSA:  wnl 4/10/2024 - utd  Colonoscopy:  5/9/2022 ([facility]) small sessile polyps x2, follow up 5 years - utd     Younger sister had colonoscopy found colon cancer in 2023, passed away earlier this month.    Eczema:  Requesting refill of topical medication used previously - does not remember      Weight Gain:     8/29/2024 9/21/2024 11/8/2024 2/17/2025   Vitals       Weight (lb) 199  198.41  198  213    BMI 30.26 kg/m2  30.18 kg/m2  30.11 kg/m2  32.39 kg/m2    BSA (m2) 2.08 m2  2.08 m2  2.08 m2  2.15 m2            ROS:    Review of systems is essentially negative for all systems except for any identified issues in HPI above.    Objective     /78   Pulse 83   Temp 36.1 °C (97 °F)   Ht 1.727 m (5' 8\")   Wt 96.6 kg (213 lb)   SpO2 98%   BMI 32.39 kg/m²      PHYSICAL EXAM    GENERAL  Well-appearing, pleasant and cooperative.  No acute distress.    HEENT  HEAD:   Normocephalic.  Atraumatic.  EYES:  PERRLA.  No scleral icterus or " conjunctival injection.  EARS:  Tympanic membranes visualized bilaterally without erythema, fluid, or bulging.  NECK:  No adenopathy.  No palpable thyroid enlargement or nodules.    THROAT:  Moist oropharynx without tonsillar enlargement or exudates.    LUNGS:    Clear to auscultation bilaterally.  No wheezes, rales, rhonchi.    CARDIAC:  Regular rate and rhythm.  Normal S1S2.  No murmurs/rubs/gallops.    ABDOMEN:  Soft, non-tender, non-distended.  No hepatosplenomegaly.  Normoactive bowel sounds.    MUSCULOSKELETAL:  No gross abnormalities.   No joint swelling or erythema,.  No spinal or paraspinal tenderness to palpation.    EXTREMITIES:  No LE edema or cyanosis.      NEURO           Alert and oriented x3. No focal deficits.    PSYCH:          Affect appropriate.           Assessment/Plan   Problem List Items Addressed This Visit       Moderate persistent asthma without complication ([facility])    Relevant Medications    fluticasone furoate-vilanteroL (Breo Ellipta) 200-25 mcg/dose inhaler    albuterol 90 mcg/actuation inhaler    Anaphylaxis due to peanuts    Relevant Medications    EPINEPHrine (Epipen) 0.3 mg/0.3 mL injection syringe    Allergic rhinitis    Relevant Medications    levocetirizine (Xyzal) 5 mg tablet     Other Visit Diagnoses       Essential (primary) hypertension    -  Primary    Relevant Medications    lisinopril 20 mg tablet    Atopic dermatitis, unspecified type        Relevant Medications    triamcinolone (Kenalog) 0.1 % ointment                 Lisa Grider MD

## 2025-02-28 ENCOUNTER — APPOINTMENT (OUTPATIENT)
Dept: PRIMARY CARE | Facility: CLINIC | Age: 55
End: 2025-02-28
Payer: COMMERCIAL

## 2025-03-20 ENCOUNTER — OFFICE VISIT (OUTPATIENT)
Dept: CARDIOLOGY | Facility: CLINIC | Age: 55
End: 2025-03-20
Payer: COMMERCIAL

## 2025-03-20 VITALS
WEIGHT: 201 LBS | HEART RATE: 93 BPM | DIASTOLIC BLOOD PRESSURE: 77 MMHG | BODY MASS INDEX: 30.56 KG/M2 | OXYGEN SATURATION: 95 % | SYSTOLIC BLOOD PRESSURE: 127 MMHG

## 2025-03-20 DIAGNOSIS — I10 PRIMARY HYPERTENSION: Primary | ICD-10-CM

## 2025-03-20 PROCEDURE — 1036F TOBACCO NON-USER: CPT | Performed by: NURSE PRACTITIONER

## 2025-03-20 PROCEDURE — 99214 OFFICE O/P EST MOD 30 MIN: CPT | Performed by: NURSE PRACTITIONER

## 2025-03-20 PROCEDURE — 3074F SYST BP LT 130 MM HG: CPT | Performed by: NURSE PRACTITIONER

## 2025-03-20 PROCEDURE — 3078F DIAST BP <80 MM HG: CPT | Performed by: NURSE PRACTITIONER

## 2025-03-20 ASSESSMENT — ENCOUNTER SYMPTOMS
MUSCULOSKELETAL NEGATIVE: 1
GASTROINTESTINAL NEGATIVE: 1
CARDIOVASCULAR NEGATIVE: 1
RESPIRATORY NEGATIVE: 1
NEUROLOGICAL NEGATIVE: 1
CONSTITUTIONAL NEGATIVE: 1

## 2025-03-20 NOTE — PROGRESS NOTES
Chief Complaint:   Follow-up (6 mth.)    History Of Present Illness:    .Mr Velazquez returns in follow up.  Denies chest pain, sob, palpitations or pedal edema.              Last Recorded Vitals:  Blood pressure 127/77, pulse 93, weight 91.2 kg (201 lb), SpO2 95%.     Past Medical History:  Past Medical History:   Diagnosis Date    Asthma 1974    Hypertension 2013 ?    Personal history of diseases of the skin and subcutaneous tissue 04/19/2016    History of urticaria    Personal history of other diseases of the musculoskeletal system and connective tissue 04/19/2016    History of arthritis    Personal history of other diseases of the respiratory system 04/19/2016    History of asthma    Snoring         Past Surgical History:  Past Surgical History:   Procedure Laterality Date    SINUS SURGERY  04/19/2016    Sinus Surgery       Social History:  Social History     Socioeconomic History    Marital status: Single   Tobacco Use    Smoking status: Never    Smokeless tobacco: Never   Substance and Sexual Activity    Alcohol use: Not Currently     Comment: rare social    Drug use: Never    Sexual activity: Not Currently     Partners: Female       Family History:  Family History   Problem Relation Name Age of Onset    Hypertension Mother Gabrielle Velazquez     Heart disease Mother Gabrielle Velazquez     Hypertension Father Ashley Velazquez     Cancer Sister Dionisio Caceres     No Known Problems Sister           Allergies:  Peanut, Grass pollen, and Shellfish derived    Outpatient Medications:  Current Outpatient Medications   Medication Sig Dispense Refill    albuterol 90 mcg/actuation inhaler Inhale 2 puffs every 4 hours if needed for wheezing or shortness of breath. 8.5 g 11    cholecalciferol (Vitamin D3) 25 MCG (1000 UT) tablet Take 1 tablet (25 mcg) by mouth once daily.      EPINEPHrine (EpiPen 2-Hosea) 0.3 mg/0.3 mL injection syringe Inject 0.3 mL (0.3 mg) into the muscle 1 time if needed.      EPINEPHrine  (Epipen) 0.3 mg/0.3 mL injection syringe Inject one syringe per package instsructions as needed for anaphylaxis.  Call 911 after use. 2 each 1    fluticasone furoate-vilanteroL (Breo Ellipta) 200-25 mcg/dose inhaler Inhale 1 puff once daily. 3 each 3    glucosamine-chondroitin 500-400 mg tablet Take 1 tablet by mouth once daily.      levocetirizine (Xyzal) 5 mg tablet Take 1 tablet (5 mg) by mouth once daily in the evening. 90 tablet 1    lisinopril 20 mg tablet Take 1 tablet (20 mg) by mouth once daily. 90 tablet 0    MULTIVITAMIN ORAL Take 1 tablet by mouth once daily.      tadalafil 20 mg tablet Take 1 tablet (20 mg) by mouth once daily as needed for erectile dysfunction. 20 tablet 3    tamsulosin (Flomax) 0.4 mg 24 hr capsule Take 1 capsule (0.4 mg) by mouth once daily in the evening. 90 capsule 3    triamcinolone (Kenalog) 0.1 % ointment Apply topically 2 times a day as needed for irritation or rash. 80 g 0     No current facility-administered medications for this visit.        Physical Exam:  Cardiovascular:      PMI at left midclavicular line. Normal rate. Regular rhythm. Normal S1. Normal S2.       Murmurs: There is no murmur.      No gallop.  No click. No rub.   Pulses:     Intact distal pulses.   Edema:     Peripheral edema absent.       ROS:  Review of Systems   Constitutional: Negative.   Cardiovascular: Negative.    Respiratory: Negative.     Skin: Negative.    Musculoskeletal: Negative.    Gastrointestinal: Negative.    Genitourinary: Negative.    Neurological: Negative.           Last Labs:  CBC -  Lab Results   Component Value Date    WBC 5.1 08/29/2024    HGB 15.0 08/29/2024    HCT 43.1 08/29/2024    MCV 93 08/29/2024     08/29/2024       CMP -  Lab Results   Component Value Date    CALCIUM 9.2 08/29/2024    PHOS 3.9 07/24/2024    PROT 7.2 08/29/2024    ALBUMIN 4.5 08/29/2024    AST 21 08/29/2024    ALT 25 08/29/2024    ALKPHOS 74 08/29/2024    BILITOT 0.4 08/29/2024       LIPID PANEL -   Lab  Results   Component Value Date    CHOL 198 08/29/2024    TRIG 141 08/29/2024    HDL 49.0 08/29/2024    CHHDL 4.0 08/29/2024    LDLF 116 (H) 08/22/2023    VLDL 22 08/22/2023       RENAL FUNCTION PANEL -   Lab Results   Component Value Date    GLUCOSE 95 08/29/2024     08/29/2024    K 5.1 08/29/2024     08/29/2024    CO2 24 08/29/2024    ANIONGAP 13 08/29/2024    BUN 17 08/29/2024    CREATININE 1.40 08/29/2024    GFRMALE 56 (A) 08/22/2023    CALCIUM 9.2 08/29/2024    PHOS 3.9 07/24/2024    ALBUMIN 4.5 08/29/2024        Lab Results   Component Value Date    HGBA1C 5.7 08/27/2021         Assessment/Plan   Problem List Items Addressed This Visit    None      1.  Hypertension.  The patient has been previously prescribed lisinopril 20 mg daily which appears to be providing adequate blood pressure control.  2.  Nondiabetic.  3.  Not hyperlipidemic.  The patient's lipid panel from 3/5/2024 included cholesterol 185  HDL 50 triglyceride 89.  4.  Former smoker.  Patient previously had smoked only social cigars.  5.?  Coronary artery disease.  Diagnosis not confirmed with a CT coronary calcium score of 0 performed on 9/9/2022.  The patient's most recent EKG from 2/21/2024 shows sinus rhythm RSR prime in V1 consistent with RV conduction delay nondiagnostic inferior Q waves.  Patient remains worried as to his cardiac status.    Echo 07/2024  CONCLUSIONS:   1. The left ventricular systolic function is normal, with a visually estimated ejection fraction of 60-65%.   2. There is normal right ventricular global systolic function.   Carotid US showed < 50% stenosis bilaterally.  6.  Bronchospastic airway disease.  Patient utilizes Breo daily with albuterol inhaler as needed.  7.  Status post bilateral total hip replacement.  8.  Status post open reduction internal fixation of right radial fracture  9.  Status post repair of deviated nasal septum.  10.  Status post hernia repair.  11.  OSIRIS on cpap.       Alaina VALDES  ROCIO Leach-CNP

## 2025-04-25 ENCOUNTER — OFFICE VISIT (OUTPATIENT)
Dept: PRIMARY CARE | Facility: CLINIC | Age: 55
End: 2025-04-25
Payer: COMMERCIAL

## 2025-04-25 DIAGNOSIS — I10 PRIMARY HYPERTENSION: ICD-10-CM

## 2025-04-25 DIAGNOSIS — J01.90 ACUTE SINUSITIS, RECURRENCE NOT SPECIFIED, UNSPECIFIED LOCATION: Primary | ICD-10-CM

## 2025-04-25 PROCEDURE — 3079F DIAST BP 80-89 MM HG: CPT | Performed by: STUDENT IN AN ORGANIZED HEALTH CARE EDUCATION/TRAINING PROGRAM

## 2025-04-25 PROCEDURE — 99214 OFFICE O/P EST MOD 30 MIN: CPT | Performed by: STUDENT IN AN ORGANIZED HEALTH CARE EDUCATION/TRAINING PROGRAM

## 2025-04-25 PROCEDURE — 3075F SYST BP GE 130 - 139MM HG: CPT | Performed by: STUDENT IN AN ORGANIZED HEALTH CARE EDUCATION/TRAINING PROGRAM

## 2025-04-25 PROCEDURE — 3008F BODY MASS INDEX DOCD: CPT | Performed by: STUDENT IN AN ORGANIZED HEALTH CARE EDUCATION/TRAINING PROGRAM

## 2025-04-25 RX ORDER — AMOXICILLIN AND CLAVULANATE POTASSIUM 875; 125 MG/1; MG/1
875 TABLET, FILM COATED ORAL 2 TIMES DAILY
Qty: 20 TABLET | Refills: 0 | Status: SHIPPED | OUTPATIENT
Start: 2025-04-25 | End: 2025-05-05

## 2025-04-25 ASSESSMENT — PAIN SCALES - GENERAL: PAINLEVEL_OUTOF10: 0-NO PAIN

## 2025-04-25 NOTE — PROGRESS NOTES
"Subjective   Ashley Velazquez is a 54 y.o. male who presents for congestion.    HPI:      Sinus Congestion:  Reports symptoms of sinus congestion for 1-1/2 weeks.  Symptoms have remained about the same since they started, no significant improvement.  He has seasonal/environmental allergies for which he takes Xyzal, however symptoms are different than normal allergy congestion.  He has been taking Mucinex and NyQuil cold and flu without significant relief.  Continues to take Xyzal for allergies as well.  No fever/chills, no GI symptoms.  Otherwise feeling well.    Hypertension:  Managed with lisinopril 20 mg daily.  Patient reports compliance with all blood pressure medications as prescribed.  Denies symptoms of chest pain/pressure/palpitations.  Also without headache or vision changes.    ROS:   Review of systems is essentially negative for all systems except for any identified issues in HPI above.    Objective     /84   Pulse 64   Temp 35.9 °C (96.7 °F)   Ht 1.727 m (5' 8\")   Wt 91.2 kg (201 lb)   SpO2 99%   BMI 30.56 kg/m²      PHYSICAL EXAM    GENERAL  Well-appearing, pleasant and cooperative.  No acute distress.    HEENT  HEAD:   Normocephalic.  Atraumatic.  EYES:  PERRLA.  No scleral icterus or conjunctival injection.  EARS:  Tympanic membranes visualized bilaterally without erythema, fluid, or bulging.  NECK:  No adenopathy.  No palpable thyroid enlargement or nodules.    THROAT:  Moist oropharynx without tonsillar enlargement or exudates.    LUNGS:    Clear to auscultation bilaterally.  No wheezes, rales, rhonchi.    CARDIAC:  Regular rate and rhythm.  Normal S1S2.  No murmurs/rubs/gallops.    ABDOMEN:  Soft, non-tender, non-distended.  No hepatosplenomegaly.  Normoactive bowel sounds.    MUSCULOSKELETAL:  No gross abnormalities.    EXTREMITIES:  No LE edema or cyanosis.      NEURO           Alert and oriented x3. No focal deficits.    PSYCH:          Affect appropriate.       "     Assessment/Plan   Problem List Items Addressed This Visit       HTN (hypertension)    Continue lisinopril 20 mg daily.  Blood pressure well-controlled during visit today.          Other Visit Diagnoses         Acute sinusitis, recurrence not specified, unspecified location    -  Primary    Augmentin started for suspected sinus infection. Also encouraged use of Mucinex, good hydration habits.  F/u if sx not improving.    Relevant Medications    amoxicillin-clavulanate (Augmentin) 875-125 mg tablet          Counseling:   Medication education:    Education: The patient is counseled regarding potential side effects of all new medications.    Understanding:  Patient expressed understanding.    Adherence:  No barriers to adherence identified.        Lisa Grider MD

## 2025-04-26 VITALS
HEART RATE: 64 BPM | DIASTOLIC BLOOD PRESSURE: 84 MMHG | WEIGHT: 201 LBS | SYSTOLIC BLOOD PRESSURE: 132 MMHG | HEIGHT: 68 IN | TEMPERATURE: 96.7 F | BODY MASS INDEX: 30.46 KG/M2 | OXYGEN SATURATION: 99 %

## 2025-05-06 ENCOUNTER — HOSPITAL ENCOUNTER (OUTPATIENT)
Dept: RADIOLOGY | Facility: HOSPITAL | Age: 55
Discharge: HOME | End: 2025-05-06
Payer: COMMERCIAL

## 2025-05-06 ENCOUNTER — OFFICE VISIT (OUTPATIENT)
Dept: PRIMARY CARE | Facility: CLINIC | Age: 55
End: 2025-05-06
Payer: COMMERCIAL

## 2025-05-06 VITALS
HEART RATE: 81 BPM | WEIGHT: 208 LBS | TEMPERATURE: 97.7 F | HEIGHT: 68 IN | SYSTOLIC BLOOD PRESSURE: 122 MMHG | BODY MASS INDEX: 31.52 KG/M2 | OXYGEN SATURATION: 98 % | DIASTOLIC BLOOD PRESSURE: 84 MMHG

## 2025-05-06 DIAGNOSIS — R05.2 SUBACUTE COUGH: ICD-10-CM

## 2025-05-06 DIAGNOSIS — R05.2 SUBACUTE COUGH: Primary | ICD-10-CM

## 2025-05-06 DIAGNOSIS — I10 PRIMARY HYPERTENSION: ICD-10-CM

## 2025-05-06 DIAGNOSIS — R06.2 WHEEZING: ICD-10-CM

## 2025-05-06 PROCEDURE — 3074F SYST BP LT 130 MM HG: CPT | Performed by: STUDENT IN AN ORGANIZED HEALTH CARE EDUCATION/TRAINING PROGRAM

## 2025-05-06 PROCEDURE — 71046 X-RAY EXAM CHEST 2 VIEWS: CPT | Performed by: RADIOLOGY

## 2025-05-06 PROCEDURE — 3008F BODY MASS INDEX DOCD: CPT | Performed by: STUDENT IN AN ORGANIZED HEALTH CARE EDUCATION/TRAINING PROGRAM

## 2025-05-06 PROCEDURE — 71046 X-RAY EXAM CHEST 2 VIEWS: CPT

## 2025-05-06 PROCEDURE — 99214 OFFICE O/P EST MOD 30 MIN: CPT | Performed by: STUDENT IN AN ORGANIZED HEALTH CARE EDUCATION/TRAINING PROGRAM

## 2025-05-06 PROCEDURE — 3079F DIAST BP 80-89 MM HG: CPT | Performed by: STUDENT IN AN ORGANIZED HEALTH CARE EDUCATION/TRAINING PROGRAM

## 2025-05-06 RX ORDER — AZITHROMYCIN 250 MG/1
TABLET, FILM COATED ORAL
Qty: 6 TABLET | Refills: 0 | Status: SHIPPED | OUTPATIENT
Start: 2025-05-06 | End: 2025-05-11

## 2025-05-06 RX ORDER — PREDNISONE 20 MG/1
40 TABLET ORAL DAILY
Qty: 10 TABLET | Refills: 0 | Status: SHIPPED | OUTPATIENT
Start: 2025-05-06 | End: 2025-05-11

## 2025-05-06 ASSESSMENT — PAIN SCALES - GENERAL: PAINLEVEL_OUTOF10: 0-NO PAIN

## 2025-05-06 ASSESSMENT — ANXIETY QUESTIONNAIRES
5. BEING SO RESTLESS THAT IT IS HARD TO SIT STILL: NOT AT ALL
2. NOT BEING ABLE TO STOP OR CONTROL WORRYING: NOT AT ALL
1. FEELING NERVOUS, ANXIOUS, OR ON EDGE: NOT AT ALL
4. TROUBLE RELAXING: NOT AT ALL
7. FEELING AFRAID AS IF SOMETHING AWFUL MIGHT HAPPEN: NOT AT ALL
IF YOU CHECKED OFF ANY PROBLEMS ON THIS QUESTIONNAIRE, HOW DIFFICULT HAVE THESE PROBLEMS MADE IT FOR YOU TO DO YOUR WORK, TAKE CARE OF THINGS AT HOME, OR GET ALONG WITH OTHER PEOPLE: NOT DIFFICULT AT ALL
GAD7 TOTAL SCORE: 0
3. WORRYING TOO MUCH ABOUT DIFFERENT THINGS: NOT AT ALL
6. BECOMING EASILY ANNOYED OR IRRITABLE: NOT AT ALL

## 2025-05-06 ASSESSMENT — COLUMBIA-SUICIDE SEVERITY RATING SCALE - C-SSRS
1. IN THE PAST MONTH, HAVE YOU WISHED YOU WERE DEAD OR WISHED YOU COULD GO TO SLEEP AND NOT WAKE UP?: NO
2. HAVE YOU ACTUALLY HAD ANY THOUGHTS OF KILLING YOURSELF?: NO
6. HAVE YOU EVER DONE ANYTHING, STARTED TO DO ANYTHING, OR PREPARED TO DO ANYTHING TO END YOUR LIFE?: NO

## 2025-05-06 NOTE — PROGRESS NOTES
"Subjective   Ashley Velazquez is a 54 y.o. male who presents for sick visit.    HPI:      54-year-old male presenting with concern for 1 week history of cough/congestion and wheezing at night.    Upper Respiratory Sx  Used albuterol once today, once last night.  Took Augmentin prescribed at last visit.  Congestion improved but wheezing has worsened.    Hypertension:  Managed with lisinopril 20 mg daily.  Patient reports compliance with all blood pressure medications as prescribed.  Denies symptoms of chest pain/pressure/palpitations.  Also without headache or vision changes.      ROS:   Review of systems is essentially negative for all systems except for any identified issues in HPI above.    Objective     /84   Pulse 81   Temp 36.5 °C (97.7 °F)   Ht 1.727 m (5' 8\")   Wt 94.3 kg (208 lb)   SpO2 98%   BMI 31.63 kg/m²      PHYSICAL EXAM    GENERAL  Well-appearing, pleasant and cooperative.  No acute distress.    HEENT  HEAD:   Normocephalic.  Atraumatic.  EYES:  PERRLA.  No scleral icterus or conjunctival injection.  EARS:  Tympanic membranes visualized bilaterally without erythema, fluid, or bulging.  NECK:  No adenopathy.  No palpable thyroid enlargement or nodules.    THROAT:  Moist oropharynx without tonsillar enlargement or exudates.    LUNGS:    Diffuse wheezing, most notable in upper lung fields bilaterally.  No rales, rhonchi.    CARDIAC:  Regular rate and rhythm.  Normal S1S2.  No murmurs/rubs/gallops.    ABDOMEN:  Soft, non-tender, non-distended.  No hepatosplenomegaly.  Normoactive bowel sounds.    MUSCULOSKELETAL:  No gross abnormalities.    EXTREMITIES:  No LE edema or cyanosis.      NEURO           Alert and oriented x3. No focal deficits.    PSYCH:          Affect appropriate.           Assessment/Plan   Problem List Items Addressed This Visit       HTN (hypertension)    Well-controlled, continue lisinopril 20 mg daily.  We discussed that lisinopril can potentially cause a dry cough as a " side effect, however given recent symptoms of congestion and also presence of wheezing I have low suspicion that this is the underlying cause of recent symptoms.  If wheezing and other issues resolve and still has lingering dry cough, can consider switch to losartan or other ARB medication.          Other Visit Diagnoses         Subacute cough    -  Primary    Relevant Medications    predniSONE (Deltasone) 20 mg tablet    azithromycin (Zithromax) 250 mg tablet    Other Relevant Orders    XR chest 2 views      Wheezing        Relevant Medications    predniSONE (Deltasone) 20 mg tablet          Patient Instructions   Thank you for coming to see me today.    5-day course of antibiotics and steroids sent to pharmacy.    Go to radiology for chest x-ray, we will notify you with results.    Go to ED for severe symptoms including shortness of breath and/or chest pain.    Follow-up with me in 1 month for YEARLY PHYSICAL.    Counseling:   Medication education:    Education: The patient is counseled regarding potential side effects of all new medications.    Understanding:  Patient expressed understanding.    Adherence:  No barriers to adherence identified.         Lisa Grider MD

## 2025-05-06 NOTE — PATIENT INSTRUCTIONS
Thank you for coming to see me today.    5-day course of antibiotics and steroids sent to pharmacy.    Go to radiology for chest x-ray, we will notify you with results.    Go to ED for severe symptoms including shortness of breath and/or chest pain.    Follow-up with me in 1 month for YEARLY PHYSICAL.

## 2025-05-06 NOTE — PROGRESS NOTES
Pt states he is experiencing coughing, congestion, and noticing wheezing at night. He states he was sick with the same symptoms  prior to the weather change and was feeling better, but symptoms arised and got worse after the weather change.

## 2025-05-07 NOTE — ASSESSMENT & PLAN NOTE
Well-controlled, continue lisinopril 20 mg daily.  We discussed that lisinopril can potentially cause a dry cough as a side effect, however given recent symptoms of congestion and also presence of wheezing I have low suspicion that this is the underlying cause of recent symptoms.  If wheezing and other issues resolve and still has lingering dry cough, can consider switch to losartan or other ARB medication.

## 2025-05-09 ENCOUNTER — APPOINTMENT (OUTPATIENT)
Dept: CARDIOLOGY | Facility: CLINIC | Age: 55
End: 2025-05-09
Payer: COMMERCIAL

## 2025-06-10 ENCOUNTER — TELEPHONE (OUTPATIENT)
Dept: PRIMARY CARE | Facility: CLINIC | Age: 55
End: 2025-06-10
Payer: COMMERCIAL

## 2025-06-10 DIAGNOSIS — J32.9 SINUSITIS, UNSPECIFIED CHRONICITY, UNSPECIFIED LOCATION: Primary | ICD-10-CM

## 2025-06-10 RX ORDER — FLUNISOLIDE 0.25 MG/ML
2 SOLUTION NASAL 2 TIMES DAILY
Qty: 25 ML | Refills: 3 | Status: SHIPPED | OUTPATIENT
Start: 2025-06-10 | End: 2026-06-10

## 2025-06-10 NOTE — TELEPHONE ENCOUNTER
Please advise - reviewed patients older medication list and flunisolide (Nasalide) 25 mcg (0.025 %) spray,non-aerosol is listed. Unsure if this is the nasal spray he would be referring to?

## 2025-06-10 NOTE — TELEPHONE ENCOUNTER
Pt lvm requesting his rx  for the nasal spray could be renewed. Stating he does not know the name of it but was told by pharmacy this has been discontinued by physician

## 2025-06-22 DIAGNOSIS — I10 ESSENTIAL (PRIMARY) HYPERTENSION: ICD-10-CM

## 2025-06-23 RX ORDER — LISINOPRIL 20 MG/1
20 TABLET ORAL DAILY
Qty: 90 TABLET | Refills: 0 | Status: SHIPPED | OUTPATIENT
Start: 2025-06-23

## 2025-07-02 ENCOUNTER — HOSPITAL ENCOUNTER (OUTPATIENT)
Dept: RADIOLOGY | Facility: HOSPITAL | Age: 55
Discharge: HOME | End: 2025-07-02
Payer: COMMERCIAL

## 2025-07-02 DIAGNOSIS — S46.912A STRAIN OF UNSPECIFIED MUSCLE, FASCIA AND TENDON AT SHOULDER AND UPPER ARM LEVEL, LEFT ARM, INITIAL ENCOUNTER: ICD-10-CM

## 2025-07-02 DIAGNOSIS — B40.9 BLASTOMYCOSIS, UNSPECIFIED: ICD-10-CM

## 2025-07-02 PROCEDURE — 73200 CT UPPER EXTREMITY W/O DYE: CPT | Mod: LT

## 2025-07-22 ENCOUNTER — APPOINTMENT (OUTPATIENT)
Dept: UROLOGY | Facility: CLINIC | Age: 55
End: 2025-07-22
Payer: COMMERCIAL

## 2025-07-22 DIAGNOSIS — Z12.5 ENCOUNTER FOR SCREENING FOR MALIGNANT NEOPLASM OF PROSTATE: ICD-10-CM

## 2025-07-22 DIAGNOSIS — N40.1 BENIGN PROSTATIC HYPERPLASIA WITH URINARY HESITANCY: Primary | ICD-10-CM

## 2025-07-22 DIAGNOSIS — R39.11 BENIGN PROSTATIC HYPERPLASIA WITH URINARY HESITANCY: Primary | ICD-10-CM

## 2025-07-22 DIAGNOSIS — N52.01 ERECTILE DYSFUNCTION DUE TO ARTERIAL INSUFFICIENCY: ICD-10-CM

## 2025-07-22 PROCEDURE — 99213 OFFICE O/P EST LOW 20 MIN: CPT | Performed by: SURGERY

## 2025-07-22 PROCEDURE — 1036F TOBACCO NON-USER: CPT | Performed by: SURGERY

## 2025-07-22 RX ORDER — TADALAFIL 20 MG/1
20 TABLET ORAL DAILY PRN
Qty: 20 TABLET | Refills: 3 | Status: SHIPPED | OUTPATIENT
Start: 2025-07-22

## 2025-07-22 RX ORDER — TAMSULOSIN HYDROCHLORIDE 0.4 MG/1
0.4 CAPSULE ORAL EVERY EVENING
Qty: 90 CAPSULE | Refills: 3 | Status: SHIPPED | OUTPATIENT
Start: 2025-07-22

## 2025-07-22 ASSESSMENT — PAIN SCALES - GENERAL: PAINLEVEL_OUTOF10: 0-NO PAIN

## 2025-07-22 NOTE — PROGRESS NOTES
Subjective   Patient ID: Ashley Velazquez is a 54 y.o. male who presents for Follow-up.    HPI  He has a known history of BPH with LUTS.  He is currently on Flomax.  His symptoms are about the same.  He reports a good stream.  He feels empty most times.  He rarely has nocturia.  He denies any dysuria or hematuria.  He also has erectile dysfunction.  He is currently on tadalafil.  He reports good results.  He has no other complaints today.                Objective   Physical Exam  Well nourished  Breathing comfortably  Abdomen soft, ND, NT              Assessment/Plan   Problem List Items Addressed This Visit    None  Visit Diagnoses         Codes      Benign prostatic hyperplasia with urinary hesitancy    -  Primary N40.1, R39.11    Relevant Medications    tamsulosin (Flomax) 0.4 mg 24 hr capsule    Other Relevant Orders    Follow Up In Urology      Erectile dysfunction due to arterial insufficiency     N52.01    Relevant Medications    tadalafil 20 mg tablet    Other Relevant Orders    Follow Up In Urology      Encounter for screening for malignant neoplasm of prostate     Z12.5    Relevant Orders    Prostate Specific Antigen             1. BPH.  Clinically he is doing well.  I will refill his Flomax today.  I will order a PSA.  He will return and see me in 6 months.    2. ED. I will refill his tadalafil today.            Silvia Christianson MD 07/22/25 2:08 PM

## 2025-08-14 ENCOUNTER — APPOINTMENT (OUTPATIENT)
Dept: RADIOLOGY | Facility: HOSPITAL | Age: 55
End: 2025-08-14
Payer: COMMERCIAL

## 2025-08-28 ENCOUNTER — HOSPITAL ENCOUNTER (OUTPATIENT)
Dept: RADIOLOGY | Facility: HOSPITAL | Age: 55
Discharge: HOME | End: 2025-08-28
Payer: COMMERCIAL

## 2025-08-28 DIAGNOSIS — R52 PAIN: ICD-10-CM

## 2025-08-28 DIAGNOSIS — S46.912A STRAIN OF UNSPECIFIED MUSCLE, FASCIA AND TENDON AT SHOULDER AND UPPER ARM LEVEL, LEFT ARM, INITIAL ENCOUNTER: ICD-10-CM

## 2025-08-28 PROCEDURE — 73221 MRI JOINT UPR EXTREM W/O DYE: CPT | Mod: LT

## 2025-08-28 PROCEDURE — 73120 X-RAY EXAM OF HAND: CPT | Mod: LT

## 2026-01-22 ENCOUNTER — APPOINTMENT (OUTPATIENT)
Dept: UROLOGY | Facility: CLINIC | Age: 56
End: 2026-01-22
Payer: COMMERCIAL